# Patient Record
Sex: MALE | Race: WHITE | Employment: FULL TIME | URBAN - METROPOLITAN AREA
[De-identification: names, ages, dates, MRNs, and addresses within clinical notes are randomized per-mention and may not be internally consistent; named-entity substitution may affect disease eponyms.]

---

## 2017-01-30 RX ORDER — DESONIDE 0.5 MG/G
CREAM TOPICAL
Qty: 60 G | Refills: 0 | Status: SHIPPED | OUTPATIENT
Start: 2017-01-30 | End: 2017-10-10 | Stop reason: ALTCHOICE

## 2017-04-26 RX ORDER — SIMVASTATIN 40 MG/1
TABLET, FILM COATED ORAL
Qty: 90 TAB | Refills: 1 | Status: SHIPPED | OUTPATIENT
Start: 2017-04-26 | End: 2018-01-19 | Stop reason: SDUPTHER

## 2017-05-11 ENCOUNTER — OFFICE VISIT (OUTPATIENT)
Dept: FAMILY MEDICINE CLINIC | Age: 57
End: 2017-05-11

## 2017-05-11 VITALS
OXYGEN SATURATION: 95 % | SYSTOLIC BLOOD PRESSURE: 147 MMHG | HEIGHT: 69 IN | RESPIRATION RATE: 15 BRPM | TEMPERATURE: 98.3 F | HEART RATE: 91 BPM | WEIGHT: 245.2 LBS | DIASTOLIC BLOOD PRESSURE: 94 MMHG | BODY MASS INDEX: 36.32 KG/M2

## 2017-05-11 DIAGNOSIS — J06.9 UPPER RESPIRATORY TRACT INFECTION, UNSPECIFIED TYPE: Primary | ICD-10-CM

## 2017-05-11 DIAGNOSIS — R05.9 COUGH: ICD-10-CM

## 2017-05-11 RX ORDER — AZITHROMYCIN 250 MG/1
TABLET, FILM COATED ORAL
Qty: 6 TAB | Refills: 0 | Status: SHIPPED | OUTPATIENT
Start: 2017-05-11 | End: 2017-05-16

## 2017-05-11 RX ORDER — PROMETHAZINE HYDROCHLORIDE AND CODEINE PHOSPHATE 6.25; 1 MG/5ML; MG/5ML
5 SOLUTION ORAL
Qty: 118 BOTTLE | Refills: 0 | Status: SHIPPED | OUTPATIENT
Start: 2017-05-11 | End: 2018-08-27 | Stop reason: ALTCHOICE

## 2017-05-11 NOTE — PATIENT INSTRUCTIONS

## 2017-05-11 NOTE — LETTER
NOTIFICATION RETURN TO WORK  
 
5/11/2017 11:31 AM 
 
Mr. Lesia Schilder [address] [place] [phone] To Whom It May Concern: 
 
Lesia Schilder is currently under the care of LUIS E Lane. He will return to work on: 5/12/17 If there are questions or concerns please have the patient contact our office. Sincerely, Alfredo Galicia NP

## 2017-05-11 NOTE — MR AVS SNAPSHOT
Visit Information Date & Time Provider Department Dept. Phone Encounter #  
 5/11/2017 11:00 AM Lolis Galan NP [address] [place] [address] [phone] [id number] Upcoming Health Maintenance Date Due INFLUENZA AGE 9 TO ADULT 8/1/2017 DTaP/Tdap/Td series (2 - Td) 8/2/2021 COLONOSCOPY 12/11/2025 Allergies as of 5/11/2017  Review Complete On: 5/11/2017 By: Lolis Galan NP No Known Allergies Current Immunizations  Never Reviewed Name Date TDAP Vaccine 8/2/2011 Not reviewed this visit You Were Diagnosed With   
  
 Codes Comments Upper respiratory tract infection, unspecified type    -  Primary ICD-10-CM: J06.9 ICD-9-CM: 465.9 Cough     ICD-10-CM: R05 ICD-9-CM: 871. 2 Vitals BP Pulse Temp Resp Height(growth percentile) Weight(growth percentile) (!) 147/94 (BP 1 Location: Left arm, BP Patient Position: Sitting) 91 98.3 °F (36.8 °C) (Oral) 15 5' 9\" (1.753 m) 245 lb 3.2 oz (111.2 kg) SpO2 BMI Smoking Status 95% 36.21 kg/m2 Former Smoker Vitals History BMI and BSA Data Body Mass Index Body Surface Area  
 36.21 kg/m 2 2.33 m 2 Preferred Pharmacy Pharmacy Name Phone [id number] [phone] AT [address] [place] [address] [phone] Your Updated Medication List  
  
   
This list is accurate as of: 5/11/17 11:28 AM.  Always use your most recent med list.  
  
  
  
  
 azithromycin 250 mg tablet Commonly known as:  ZITHROMAX  
use as directed  
  
 beta carotene 25,000 unit capsule Take 25,000 Units by mouth daily. desonide 0.05 % cream  
Commonly known as:  TRIDESILON  
APPLY TO AFFECTED AREA 2 TIMES A DAY. FLUoxetine 20 mg capsule Commonly known as:  PROzac TAKE ONE CAPSULE BY MOUTH EVERY DAY  
  
 methylPREDNISolone 4 mg tablet Commonly known as:  Mickiel Meres Use as directed.   
  
 mometasone 0.1 % topical cream  
 Commonly known as:  ELOCON  
APPLY TO AFFECTED AREA DAILY. montelukast 10 mg tablet Commonly known as:  SINGULAIR  
TAKE 1 TALBET BY MOUTH DAILY promethazine-codeine 6.25-10 mg/5 mL syrup Commonly known as:  PHENERGAN with CODEINE Take 5 mL by mouth four (4) times daily as needed for Cough. Max Daily Amount: 20 mL. Indications: ALLERGIC RHINITIS, COLD SYMPTOMS, COUGH  
  
 simvastatin 40 mg tablet Commonly known as:  ZOCOR  
TAKE 1 TABLET BY MOUTH NIGHTLY. VITAMIN D3 1,000 unit Cap Generic drug:  cholecalciferol Take 2,000 Int'l Units/L by mouth.  
  
 vitamin E 400 unit capsule Commonly known as:  [address] Take  by mouth daily. ZyrTEC 10 mg Cap Generic drug:  Cetirizine Take  by mouth. Prescriptions Printed Refills  
 promethazine-codeine (PHENERGAN WITH CODEINE) 6.25-10 mg/5 mL syrup 0 Sig: Take 5 mL by mouth four (4) times daily as needed for Cough. Max Daily Amount: 20 mL. Indications: ALLERGIC RHINITIS, COLD SYMPTOMS, COUGH Class: Print Route: Oral  
  
Prescriptions Sent to Pharmacy Refills  
 azithromycin (ZITHROMAX) 250 mg tablet 0 Sig: use as directed Class: Normal  
 Pharmacy: [address] [place] [address], [facility] #: [phone] Patient Instructions Upper Respiratory Infection (Cold): Care Instructions Your Care Instructions An upper respiratory infection, or URI, is an infection of the nose, sinuses, or throat. URIs are spread by coughs, sneezes, and direct contact. The common cold is the most frequent kind of URI. The flu and sinus infections are other kinds of URIs. Almost all URIs are caused by viruses. Antibiotics won't cure them. But you can treat most infections with home care. This may include drinking lots of fluids and taking over-the-counter pain medicine. You will probably feel better in 4 to 10 days. The doctor has checked you carefully, but problems can develop later. If you notice any problems or new symptoms, get medical treatment right away. Follow-up care is a key part of your treatment and safety. Be sure to make and go to all appointments, and call your doctor if you are having problems. It's also a good idea to know your test results and keep a list of the medicines you take. How can you care for yourself at home? · To prevent dehydration, drink plenty of fluids, enough so that your urine is light yellow or clear like water. Choose water and other caffeine-free clear liquids until you feel better. If you have kidney, heart, or liver disease and have to limit fluids, talk with your doctor before you increase the amount of fluids you drink. · Take an over-the-counter pain medicine, such as acetaminophen (Tylenol), ibuprofen (Advil, Motrin), or naproxen (Aleve). Read and follow all instructions on the label. · Before you use cough and cold medicines, check the label. These medicines may not be safe for young children or for people with certain health problems. · Be careful when taking over-the-counter cold or flu medicines and Tylenol at the same time. Many of these medicines have acetaminophen, which is Tylenol. Read the labels to make sure that you are not taking more than the recommended dose. Too much acetaminophen (Tylenol) can be harmful. · Get plenty of rest. 
· Do not smoke or allow others to smoke around you. If you need help quitting, talk to your doctor about stop-smoking programs and medicines. These can increase your chances of quitting for good. When should you call for help? Call 911 anytime you think you may need emergency care. For example, call if: 
· You have severe trouble breathing. Call your doctor now or seek immediate medical care if: 
· You seem to be getting much sicker. · You have new or worse trouble breathing. · You have a new or higher fever. · You have a new rash. Watch closely for changes in your health, and be sure to contact your doctor if: 
· You have a new symptom, such as a sore throat, an earache, or sinus pain. · You cough more deeply or more often, especially if you notice more mucus or a change in the color of your mucus. · You do not get better as expected. Where can you learn more? Go to http://ranjith-sho.info/. Enter X416 in the search box to learn more about \"Upper Respiratory Infection (Cold): Care Instructions. \" Current as of: June 30, 2016 Content Version: 11.2 © 6414-6298 [employer]. Care instructions adapted under license by [employer] (which disclaims liability or warranty for this information). If you have questions about a medical condition or this instruction, always ask your healthcare professional. [place] [address] any warranty or liability for your use of this information. Introducing [facility]! Dear William Land: 
Thank you for requesting a ShowMe account. Our records indicate that you already have an active ShowMe account. You can access your account anytime at [email or web address] Did you know that you can access your hospital and ER discharge instructions at any time in ShowMe? You can also review all of your test results from your hospital stay or ER visit. Additional Information If you have questions, please visit the Frequently Asked Questions section of the ShowMe website at [email or web address] Remember, ShowMe is NOT to be used for urgent needs. For medical emergencies, dial 911. Now available from your iPhone and Android! Please provide this summary of care documentation to your next provider. Your primary care clinician is listed as [place] [address], [facility]/s . If you have any questions after today's visit, please call [phone].

## 2017-05-11 NOTE — PROGRESS NOTES
Chief Complaint   Patient presents with    Cold Symptoms       1. Have you been to the ER, urgent care clinic since your last visit? Hospitalized since your last visit? No    2. Have you seen or consulted any other health care providers outside of the [address] [place] [address] since your last visit? Include any pap smears or colon screening. No    Body mass index is 36.21 kg/(m^2).

## 2017-05-11 NOTE — PROGRESS NOTES
Moira Moreno is a 55 y.o. male who is being seen  today for an acute care visit  today (5/11/2017). Assessments and plans as follows:     Assessment & Plan:  Fransisco Hendrix was seen today for cold symptoms. Diagnoses and all orders for this visit:    Upper respiratory tract infection, unspecified type  -     promethazine-codeine (PHENERGAN WITH CODEINE) 6.25-10 mg/5 mL syrup; Take 5 mL by mouth four (4) times daily as needed for Cough. Max Daily Amount: 20 mL. Indications: ALLERGIC RHINITIS, COLD SYMPTOMS, COUGH  -     azithromycin (ZITHROMAX) 250 mg tablet; use as directed    Cough  -     promethazine-codeine (PHENERGAN WITH CODEINE) 6.25-10 mg/5 mL syrup; Take 5 mL by mouth four (4) times daily as needed for Cough. Max Daily Amount: 20 mL. Indications: ALLERGIC RHINITIS, COLD SYMPTOMS, COUGH  -     azithromycin (ZITHROMAX) 250 mg tablet; use as directed         ----------------------------------------------------------------------    Subjective / HPI:  Moira Moreno presents to office today for an acute care visit for cold like symptoms that started 6 days ago, that began with a sore throat. Now he has  Productive cough with greenish phlegm. He awakens in the middle of the night with coughing spells. He endorses fevers no chills, low grade 100.4. No eyes or ear involvement. He has a history of sinusitis. He has been using OTC mucinex for the past 2-3 days. Symptoms improve, only to return. Prior to Admission medications    Medication Sig Start Date End Date Taking?  Authorizing Provider   simvastatin (ZOCOR) 40 mg tablet TAKE 1 TABLET BY MOUTH NIGHTLY. 4/26/17  Yes Mian Cash NP   desonide (TRIDESILON) 0.05 % cream APPLY TO AFFECTED AREA 2 TIMES A DAY. 1/30/17  Yes Radha Multani MD   mometasone (ELOCON) 0.1 % topical cream APPLY TO AFFECTED AREA DAILY. 11/29/16  Yes Kaylene Schumacher MD   FLUoxetine (PROZAC) 20 mg capsule TAKE ONE CAPSULE BY MOUTH EVERY DAY 10/3/16  Yes John Woods NP   Cetirizine (ZYRTEC) 10 mg cap Take  by mouth. Yes Historical Provider   beta carotene 25,000 unit capsule Take 25,000 Units by mouth daily. Yes Historical Provider   vitamin E (AQUA GEMS) 400 unit capsule Take  by mouth daily. Yes Historical Provider   Cholecalciferol, Vitamin D3, (VITAMIN D3) 1,000 unit cap Take 2,000 Int'l Units/L by mouth. Yes Historical Provider   methylPREDNISolone (MEDROL DOSEPACK) 4 mg tablet Use as directed. 12/8/16   Jem Shape BERENICE Saini   montelukast (SINGULAIR) 10 mg tablet TAKE 1 TALBET BY MOUTH DAILY 11/7/16   Kaylene Schumacher MD          No Known Allergies        ROS    See HPI    Past Medical History:   Diagnosis Date    Acne     Allergic rhinitis, cause unspecified 5/13/2014    Contact dermatitis- ketoconazole 1/20/2015    Contact dermatitis- ketoconazole 1/20/2015    Depression     Eczema 1/20/2015    FARZANA (obstructive sleep apnea) 2006    cpap 12cm    Other and unspecified hyperlipidemia     Sleep apnea 8/2/2011    Vitamin D deficiency 5/13/2014       Visit Vitals    BP (!) 147/94 (BP 1 Location: Left arm, BP Patient Position: Sitting)    Pulse 91    Temp 98.3 °F (36.8 °C) (Oral)    Resp 15    Ht 5' 9\" (1.753 m)    Wt 245 lb 3.2 oz (111.2 kg)    SpO2 95%    BMI 36.21 kg/m2       Objective:   Physical Exam   Cardiovascular: Regular rhythm and normal heart sounds. No murmur heard. Pulmonary/Chest: Effort normal and breath sounds normal. He has no wheezes. He has no rales. Abdominal: Soft. Bowel sounds are normal. There is no hepatosplenomegaly. There is no tenderness. There is no rebound. Disclaimer:  Advised him to call back or return to office if symptoms worsen/change/persist.  Discussed expected course/resolution/complications of diagnosis in detail with patient.     Medication risks/benefits/costs/interactions/alternatives discussed with patient. He was given an after visit summary which includes diagnoses, current medications, & vitals. He expressed understanding with the diagnosis and plan.         Electronic Signature  Adelaida Edgar NP , [facility]  [phone]

## 2017-06-06 RX ORDER — MOMETASONE FUROATE 1 MG/G
CREAM TOPICAL
Qty: 60 G | Refills: 3 | Status: SHIPPED | OUTPATIENT
Start: 2017-06-06 | End: 2019-02-03 | Stop reason: SDUPTHER

## 2017-09-21 RX ORDER — FLUOXETINE HYDROCHLORIDE 20 MG/1
CAPSULE ORAL
Qty: 90 CAP | Refills: 3 | Status: SHIPPED | OUTPATIENT
Start: 2017-09-21 | End: 2018-09-30 | Stop reason: SDUPTHER

## 2017-10-09 ENCOUNTER — OFFICE VISIT (OUTPATIENT)
Dept: FAMILY MEDICINE CLINIC | Age: 57
End: 2017-10-09

## 2017-10-09 VITALS
TEMPERATURE: 97.8 F | RESPIRATION RATE: 18 BRPM | OXYGEN SATURATION: 96 % | HEIGHT: 69 IN | BODY MASS INDEX: 36.97 KG/M2 | HEART RATE: 69 BPM | WEIGHT: 249.6 LBS | SYSTOLIC BLOOD PRESSURE: 133 MMHG | DIASTOLIC BLOOD PRESSURE: 84 MMHG

## 2017-10-09 DIAGNOSIS — E55.9 VITAMIN D DEFICIENCY: ICD-10-CM

## 2017-10-09 DIAGNOSIS — L29.9 ITCHY SKIN: ICD-10-CM

## 2017-10-09 DIAGNOSIS — Z12.5 PROSTATE CANCER SCREENING: ICD-10-CM

## 2017-10-09 DIAGNOSIS — E78.5 HYPERLIPIDEMIA LDL GOAL <130: Primary | ICD-10-CM

## 2017-10-09 RX ORDER — LORATADINE 10 MG/1
10 TABLET ORAL
COMMUNITY

## 2017-10-09 RX ORDER — TRIAMCINOLONE ACETONIDE 1 MG/G
CREAM TOPICAL 2 TIMES DAILY
Qty: 60 G | Refills: 1 | Status: SHIPPED | OUTPATIENT
Start: 2017-10-09 | End: 2019-02-03 | Stop reason: SDUPTHER

## 2017-10-09 NOTE — PROGRESS NOTES
HISTORY OF PRESENT ILLNESS  HPI  Arturo Iverson is a 55 y.o. male with history of hyperlipidemia and vitamin D deficiency who presents to office today for a rash. Pt complains of a full-body itchy rash, especially on his neck, chest, and groin, for 2+ years. He has seen a dermatologist and was told the rash was candidiasis; a biopsy of the rash one year ago was positive for candida. He was given an antifungal shampoo, which he used without relief; he has also used various lotions without relief. He tried Aquaflora with relief and has also used clobetasol with some relief. Pt states that he showers once a day with lukewarm water. Past Medical History:   Diagnosis Date    Acne     Allergic rhinitis, cause unspecified 5/13/2014    Contact dermatitis- ketoconazole 1/20/2015    Contact dermatitis- ketoconazole 1/20/2015    Depression     Eczema 1/20/2015    Hyperlipidemia LDL goal <130 10/10/2017    Itchy skin- entire body . x face-allergist and dermatologist treatments have not helped-started~2013 10/10/2017    FARZANA (obstructive sleep apnea) 2006    cpap 12cm    Other and unspecified hyperlipidemia     Sleep apnea 8/2/2011    Vitamin D deficiency 5/13/2014     History reviewed. No pertinent surgical history. Current Outpatient Prescriptions on File Prior to Visit   Medication Sig Dispense Refill    FLUoxetine (PROZAC) 20 mg capsule TAKE ONE CAPSULE BY MOUTH EVERY DAY 90 Cap 3    mometasone (ELOCON) 0.1 % topical cream APPLY TO AFFECTED AREA DAILY. 60 g 3    promethazine-codeine (PHENERGAN WITH CODEINE) 6.25-10 mg/5 mL syrup Take 5 mL by mouth four (4) times daily as needed for Cough. Max Daily Amount: 20 mL. Indications: ALLERGIC RHINITIS, COLD SYMPTOMS, COUGH 118 Bottle 0    simvastatin (ZOCOR) 40 mg tablet TAKE 1 TABLET BY MOUTH NIGHTLY. 90 Tab 1    montelukast (SINGULAIR) 10 mg tablet TAKE 1 TALBET BY MOUTH DAILY 30 Tab 11    Cetirizine (ZYRTEC) 10 mg cap Take  by mouth.       beta carotene 25,000 unit capsule Take 25,000 Units by mouth daily.  vitamin E (AQUA GEMS) 400 unit capsule Take  by mouth daily.  Cholecalciferol, Vitamin D3, (VITAMIN D3) 1,000 unit cap Take 2,000 Int'l Units/L by mouth. No current facility-administered medications on file prior to visit. No Known Allergies  Family History   Problem Relation Age of Onset    Diabetes Mother     Elevated Lipids Mother     Hypertension Mother     Diabetes Father     Heart Disease Father     Stroke Father     Hypertension Father     Elevated Lipids Father     Heart Disease Brother      heart valve replacement     Social History     Social History    Marital status:      Spouse name: N/A    Number of children: N/A    Years of education: N/A     Social History Main Topics    Smoking status: Former Smoker     Quit date: 7/15/1987    Smokeless tobacco: Never Used    Alcohol use 2.5 oz/week     5 Cans of beer per week    Drug use: No    Sexual activity: Yes     Partners: Female     Other Topics Concern     Service Yes    Blood Transfusions No    Caffeine Concern No    Occupational Exposure No    Hobby Hazards No    Sleep Concern No    Stress Concern No    Weight Concern Yes    Special Diet No    Back Care Yes     occasional visits to chiropractor    Exercise No    Bike Helmet Not Asked     na    Seat Belt Yes    Self-Exams No     Social History Narrative             Review of Systems   Constitutional: Negative for chills, diaphoresis, fever, malaise/fatigue and weight loss. Eyes: Negative for blurred vision, double vision, pain and redness. Respiratory: Negative for cough, shortness of breath and wheezing. Cardiovascular: Negative for chest pain, palpitations, orthopnea, claudication, leg swelling and PND. Skin: Positive for itching and rash (erythema).    Neurological: Negative for dizziness, tingling, tremors, sensory change, speech change, focal weakness, seizures, loss of consciousness, weakness and headaches. Results for orders placed or performed in visit on 77/37/13   METABOLIC PANEL, COMPREHENSIVE   Result Value Ref Range    Glucose 102 (H) 65 - 99 mg/dL    BUN 12 6 - 24 mg/dL    Creatinine 1.08 0.76 - 1.27 mg/dL    GFR est non-AA 77 >59 mL/min/1.73    GFR est AA 89 >59 mL/min/1.73    BUN/Creatinine ratio 11 9 - 20    Sodium 141 134 - 144 mmol/L    Potassium 4.5 3.5 - 5.2 mmol/L    Chloride 102 97 - 108 mmol/L    CO2 24 18 - 29 mmol/L    Calcium 8.9 8.7 - 10.2 mg/dL    Protein, total 6.4 6.0 - 8.5 g/dL    Albumin 4.2 3.5 - 5.5 g/dL    GLOBULIN, TOTAL 2.2 1.5 - 4.5 g/dL    A-G Ratio 1.9 1.1 - 2.5    Bilirubin, total 0.3 0.0 - 1.2 mg/dL    Alk.  phosphatase 61 39 - 117 IU/L    AST (SGOT) 24 0 - 40 IU/L    ALT (SGPT) 30 0 - 44 IU/L   LIPID PANEL   Result Value Ref Range    Cholesterol, total 155 100 - 199 mg/dL    Triglyceride 130 0 - 149 mg/dL    HDL Cholesterol 40 >39 mg/dL    VLDL, calculated 26 5 - 40 mg/dL    LDL, calculated 89 0 - 99 mg/dL   CBC W/O DIFF   Result Value Ref Range    WBC 6.7 3.4 - 10.8 x10E3/uL    RBC 4.87 4.14 - 5.80 x10E6/uL    HGB 14.7 12.6 - 17.7 g/dL    HCT 44.1 37.5 - 51.0 %    MCV 91 79 - 97 fL    MCH 30.2 26.6 - 33.0 pg    MCHC 33.3 31.5 - 35.7 g/dL    RDW 13.8 12.3 - 15.4 %    PLATELET 306 698 - 218 x10E3/uL   URINALYSIS W/MICROSCOPIC   Result Value Ref Range    Specific Gravity 1.021 1.005 - 1.030    pH (UA) 5.5 5.0 - 7.5    Color Yellow Yellow    Appearance Clear Clear    Leukocyte Esterase Negative Negative    Protein Negative Negative/Trace    Glucose Negative Negative    Ketone Negative Negative    Blood Negative Negative    Bilirubin Negative Negative    Urobilinogen 0.2 0.2 - 1.0 mg/dL    Nitrites Negative Negative    Microscopic Examination Comment     Microscopic exam See additional order    PROSTATE SPECIFIC AG (PSA)   Result Value Ref Range    Prostate Specific Ag 0.4 0.0 - 4.0 ng/mL   VITAMIN D, 25 HYDROXY   Result Value Ref Range VITAMIN D, 25-HYDROXY 31.3 30.0 - 100.0 ng/mL   MICROSCOPIC EXAMINATION   Result Value Ref Range    WBC 0-5 0 - 5 /hpf    RBC 0-2 0 - 2 /hpf    Epithelial cells None seen 0 - 10 /hpf    Casts None seen None seen /lpf    Mucus Present Not Estab. Bacteria Few None seen/Few   CVD REPORT   Result Value Ref Range    INTERPRETATION Note              Physical Exam  Visit Vitals    /84 (BP 1 Location: Left arm, BP Patient Position: Sitting)    Pulse 69    Temp 97.8 °F (36.6 °C) (Oral)    Resp 18    Ht 5' 9\" (1.753 m)    Wt 249 lb 9.6 oz (113.2 kg)    SpO2 96%    BMI 36.86 kg/m2     Lungs: clear to auscultation bilaterally  Heart: regular rate and rhythm, S1, S2 normal, no murmur, click, rub or gallop  Skin: in general he has dry skin, no rash that you can appreciate in most of the areas he points out, just some slight erythema where he was maybe scratching the area, no vesicles or pustules. In the genital area, there is some very slight erythema in the crease of the leg where it meets the groin but no other rash appreciated          ASSESSMENT and PLAN    ICD-10-CM ICD-9-CM    1. Hyperlipidemia LDL goal <130 E78.5 272.4 LIPID PANEL      METABOLIC PANEL, COMPREHENSIVE   2. Itchy skin- entire body . x face-allergist and dermatologist treatments have not helped-started~2013 L29.9 698.9 GEL DRESSING (AQUAFLO EX)      loratadine (CLARITIN) 10 mg tablet      triamcinolone acetonide (KENALOG) 0.1 % topical cream   3. Vitamin D deficiency E55.9 268.9 VITAMIN D, 25 HYDROXY   4. Prostate cancer screening Z12.5 V76.44 PSA, DIAGNOSTIC (PROSTATE SPECIFIC AG)     Diagnoses and all orders for this visit:    1. Hyperlipidemia LDL goal <130  -     LIPID PANEL  -     METABOLIC PANEL, COMPREHENSIVE    2. Itchy skin- entire body . x face-allergist and dermatologist treatments have not helped-started~2013  -     triamcinolone acetonide (KENALOG) 0.1 % topical cream; Apply  to affected area two (2) times a day.  use thin layer    3. Vitamin D deficiency  -     VITAMIN D, 25 HYDROXY    4. Prostate cancer screening  -     PROSTATE SPECIFIC AG      Follow-up Disposition:  Return in about 6 months (around 4/9/2018), or if symptoms worsen or fail to improve, for F/U cholesterol, obesity. reviewed medications and side effects in detail  Please call my office if there are any questions- [phone]. Discussed expected course/resolution/complications of diagnosis in detail with patient. Medication risks/benefits/costs/interactions/alternatives discussed with patient. Pt was given an after visit summary which includes diagnoses, current medications & vitals. Pt expressed understanding with the diagnosis and plan. Total 25 minutes,60 % counseling re: Reviewed symptoms, or lack thereof, of hypertension and elevated cholesterol. I suggested that he try a heated Vaseline application to his skin in general after showering and continue with a low temperature shower. He should use a less drying soap, either Dove or Lever 2000. For the marked itching that he's having, he can use Kenalog cream sparingly. I told him I hadn't seen anyone before who had a diffuse candida skin infection, so he needs to follow that up with the dermatologist if he doesn't get better with the above recommendations. He's past due for follow-up on his cholesterol so we put the labs in the computer, and he can come back and do that at his convenience. He takes simvastatin regularly for that. He is on Vit d regularly for the VIt D def. Also, discussed symptoms of concern that were noted today in the note above, treatment options( including doing nothing), when to follow up before recommended time frame. Also, answered all questions. This document was written by Jilda Gowers, as dictated by Vasiliy Sr MD.  I have reviewed and agree with the above note and have made corrections where appropriate José Manuel Andre M.D.

## 2017-10-09 NOTE — PATIENT INSTRUCTIONS
Candidiasis: Care Instructions  Your Care Instructions  Candidiasis (say \"xno-hhg-ZB-uh-roby\") is a yeast infection. Yeast normally lives in your body. But it can cause problems if your body's defenses don't work as they should. Some medicines can increase your chance of getting a yeast infection. These include antibiotics, steroids, and cancer drugs. And some diseases like AIDS and diabetes can make you more likely to get yeast infections. There are different types of yeast infections. Papi Bushy is a yeast infection in the mouth. It usually occurs in people with weak immune systems. It causes white patches inside the mouth and throat. Yeast infections of the skin usually occur in skin folds where the skin stays moist. They cause red, oozing patches on your skin. Babies can get these infections under the diaper. People who often wear gloves can get them on their hands. Many women get vaginal yeast infections. They are most common when women take antibiotics. These infections can cause the vagina to itch and burn. They also cause white discharge that looks like cottage cheese. In rare cases, yeast infects the blood. This can cause serious disease. This kind of infection is treated with medicine given through a needle into a vein (IV). After you start treatment, a yeast infection usually goes away quickly. But if your immune system is weak, the infection may come back. Tell your doctor if you get yeast infections often. Follow-up care is a key part of your treatment and safety. Be sure to make and go to all appointments, and call your doctor if you are having problems. It's also a good idea to know your test results and keep a list of the medicines you take. How can you care for yourself at home? · Take your medicines exactly as prescribed. Call your doctor if you think you are having a problem with your medicine. · Use antibiotics only as directed by your doctor. · Eat yogurt with live cultures.  It has bacteria called lactobacillus. It may help prevent some types of yeast infections. · Keep your skin clean and dry. Put powder on moist places. · If you are using a cream or suppository to treat a vaginal yeast infection, don't use condoms or a diaphragm. Use a different type of birth control. · Eat a healthy diet and get regular exercise. This will help keep your immune system strong. When should you call for help? Call your doctor now or seek immediate medical care if:  · You have a fever. · You are pregnant and have signs of a vaginal or urinary tract infection such as:  ¨ Severe itching in your vagina. ¨ Pain during sex or when you urinate. ¨ Unusual discharge from your vagina. ¨ A frequent urge to urinate. ¨ Urine that is cloudy or smells bad. Watch closely for changes in your health, and be sure to contact your doctor if:  · You do not get better as expected. Where can you learn more? Go to http://ranjith-sho.info/. Enter T045 in the search box to learn more about \"Candidiasis: Care Instructions. \"  Current as of: October 13, 2016  Content Version: 11.3  © 1920-6252 [employer]. Care instructions adapted under license by [employer] (which disclaims liability or warranty for this information). If you have questions about a medical condition or this instruction, always ask your healthcare professional. [address] any warranty or liability for your use of this information.

## 2017-10-09 NOTE — PROGRESS NOTES
\"Reviewed record in preparation for visit and have obtained the necessary documentation\"  Chief Complaint   Patient presents with    Rash     itching to neck,chest,and groin areas chronically, advised per [facility] skin            1. Have you been to the ER, urgent care clinic since your last visit? Hospitalized since your last visit? No    2. Have you seen or consulted any other health care providers outside of the [address] [place] [address] since your last visit? Include any pap smears or colon screening.  No

## 2017-10-10 PROBLEM — L29.9 ITCHY SKIN: Chronic | Status: ACTIVE | Noted: 2017-10-10

## 2017-10-10 PROBLEM — E78.5 HYPERLIPIDEMIA LDL GOAL <130: Status: ACTIVE | Noted: 2017-10-10

## 2017-10-10 PROBLEM — L29.9 ITCHY SKIN: Status: ACTIVE | Noted: 2017-10-10

## 2018-01-16 LAB
25(OH)D3+25(OH)D2 SERPL-MCNC: 36.4 NG/ML (ref 30–100)
ALBUMIN SERPL-MCNC: 4.2 G/DL (ref 3.5–5.5)
ALBUMIN/GLOB SERPL: 1.6 {RATIO} (ref 1.2–2.2)
ALP SERPL-CCNC: 57 IU/L (ref 39–117)
ALT SERPL-CCNC: 29 IU/L (ref 0–44)
AST SERPL-CCNC: 24 IU/L (ref 0–40)
BILIRUB SERPL-MCNC: 0.4 MG/DL (ref 0–1.2)
BUN SERPL-MCNC: 14 MG/DL (ref 6–24)
BUN/CREAT SERPL: 14 (ref 9–20)
CALCIUM SERPL-MCNC: 9 MG/DL (ref 8.7–10.2)
CHLORIDE SERPL-SCNC: 101 MMOL/L (ref 96–106)
CHOLEST SERPL-MCNC: 188 MG/DL (ref 100–199)
CO2 SERPL-SCNC: 25 MMOL/L (ref 18–29)
CREAT SERPL-MCNC: 0.98 MG/DL (ref 0.76–1.27)
GLOBULIN SER CALC-MCNC: 2.7 G/DL (ref 1.5–4.5)
GLUCOSE SERPL-MCNC: 93 MG/DL (ref 65–99)
HDLC SERPL-MCNC: 45 MG/DL
INTERPRETATION, 910389: NORMAL
LDLC SERPL CALC-MCNC: 115 MG/DL (ref 0–99)
POTASSIUM SERPL-SCNC: 4.3 MMOL/L (ref 3.5–5.2)
PROT SERPL-MCNC: 6.9 G/DL (ref 6–8.5)
PSA SERPL-MCNC: 0.7 NG/ML (ref 0–4)
SODIUM SERPL-SCNC: 141 MMOL/L (ref 134–144)
TRIGL SERPL-MCNC: 142 MG/DL (ref 0–149)
VLDLC SERPL CALC-MCNC: 28 MG/DL (ref 5–40)

## 2018-01-22 RX ORDER — SIMVASTATIN 40 MG/1
TABLET, FILM COATED ORAL
Qty: 90 TAB | Refills: 1 | Status: SHIPPED | OUTPATIENT
Start: 2018-01-22 | End: 2018-08-17 | Stop reason: SDUPTHER

## 2018-04-12 NOTE — PROGRESS NOTES
GREAT NEWS!! All of your recent lab tests are normal or at goal. I would continue everything the same and schedule your next fasting office visit in 6 months. In addition, a physical is recommended every 2 years until the age of 61, but a prostate check should be done on a yearly basis after 40.

## 2018-08-21 RX ORDER — SIMVASTATIN 40 MG/1
TABLET, FILM COATED ORAL
Qty: 90 TAB | Refills: 0 | Status: SHIPPED | OUTPATIENT
Start: 2018-08-21 | End: 2018-12-09 | Stop reason: SDUPTHER

## 2018-08-27 ENCOUNTER — OFFICE VISIT (OUTPATIENT)
Dept: FAMILY MEDICINE CLINIC | Age: 58
End: 2018-08-27

## 2018-08-27 VITALS
HEART RATE: 73 BPM | HEIGHT: 69 IN | BODY MASS INDEX: 36.61 KG/M2 | OXYGEN SATURATION: 96 % | WEIGHT: 247.2 LBS | SYSTOLIC BLOOD PRESSURE: 139 MMHG | TEMPERATURE: 98.2 F | DIASTOLIC BLOOD PRESSURE: 88 MMHG | RESPIRATION RATE: 15 BRPM

## 2018-08-27 DIAGNOSIS — F33.9 RECURRENT MAJOR DEPRESSIVE DISORDER, REMISSION STATUS UNSPECIFIED (HCC): ICD-10-CM

## 2018-08-27 DIAGNOSIS — E78.5 HYPERLIPIDEMIA LDL GOAL <130: Primary | ICD-10-CM

## 2018-08-27 PROBLEM — E66.01 SEVERE OBESITY (BMI 35.0-39.9): Status: ACTIVE | Noted: 2018-08-27

## 2018-08-27 NOTE — MR AVS SNAPSHOT
Carolee Cordova 
 
 
 [address] [facility] [address] [place], [place] [id number] Patient: Perry Deluca MRN: [id number] :1960 Visit Information Date & Time Provider Department Dept. Phone Encounter #  
 2018  8:15 AM Asad Burnett, [address] [place] [phone] [id number] Upcoming Health Maintenance Date Due Influenza Age 5 to Adult 2018 DTaP/Tdap/Td series (2 - Td) 2021 COLONOSCOPY 2025 Allergies as of 2018  Review Complete On: 2018 By: Asad Burnett NP No Known Allergies Current Immunizations  Never Reviewed Name Date TDAP Vaccine 2011 Not reviewed this visit You Were Diagnosed With   
  
 Codes Comments Hyperlipidemia LDL goal <130    -  Primary ICD-10-CM: E78.5 ICD-9-CM: 272.4 Vitals BP Pulse Temp Resp Height(growth percentile) Weight(growth percentile) 139/88 (BP 1 Location: Right arm, BP Patient Position: Sitting) 73 98.2 °F (36.8 °C) (Oral) 15 5' 9\" (1.753 m) 247 lb 3.2 oz (112.1 kg) SpO2 BMI Smoking Status 96% 36.51 kg/m2 Former Smoker Vitals History BMI and BSA Data Body Mass Index Body Surface Area  
 36.51 kg/m 2 2.34 m 2 Preferred Pharmacy Pharmacy Name Phone [id number]- [place] - [address] [facility] AT [address] [place] [address] [phone] Your Updated Medication List  
  
   
This list is accurate as of 18  8:48 AM.  Always use your most recent med list.  
  
  
  
  
 beta carotene 25,000 unit capsule Take 25,000 Units by mouth daily. CLARITIN 10 mg tablet Generic drug:  loratadine Take 10 mg by mouth. FLUoxetine 20 mg capsule Commonly known as:  PROzac TAKE ONE CAPSULE BY MOUTH EVERY DAY  
  
 mometasone 0.1 % topical cream  
Commonly known as:  ELOCON  
APPLY TO AFFECTED AREA DAILY. simvastatin 40 mg tablet Commonly known as:  ZOCOR  
TAKE 1 TABLET BY MOUTH NIGHTLY.  
  
 triamcinolone acetonide 0.1 % topical cream  
Commonly known as:  KENALOG Apply  to affected area two (2) times a day. use thin layer  
  
 vitamin a-vitamin c-vit e-min tablet Commonly known as:  [place] Nicole Take 1 Tab by mouth daily. VITAMIN D3 1,000 unit Cap Generic drug:  cholecalciferol Take 2,000 Int'l Units/L by mouth.  
  
 vitamin E 400 unit capsule Commonly known as:  [address] Take  by mouth daily. We Performed the Following COLLECTION VENOUS BLOOD,VENIPUNCTURE R6204256 CPT(R)] LIPID PANEL [52612 CPT(R)] METABOLIC PANEL, COMPREHENSIVE [88297 CPT(R)] [place] HANDLG&/OR CONVEY OF SPEC FOR TR OFFICE TO LAB [88742 CPT(R)] Introducing [facility]! Dear Adelita Schaefer: 
Thank you for requesting a PSafe account. Our records indicate that you already have an active PSafe account. You can access your account anytime at [email or web address] Did you know that you can access your hospital and ER discharge instructions at any time in PSafe? You can also review all of your test results from your hospital stay or ER visit. Additional Information If you have questions, please visit the Frequently Asked Questions section of the PSafe website at [email or web address] Remember, PSafe is NOT to be used for urgent needs. For medical emergencies, dial 911. Now available from your iPhone and Android! Please provide this summary of care documentation to your next provider. Your primary care clinician is listed as [place] [address], [facility]/s . If you have any questions after today's visit, please call [phone].

## 2018-08-27 NOTE — PROGRESS NOTES
HISTORY OF PRESENT ILLNESS  Kalpana Rivers is a 65 y.o. male. HPI  Follow up chronic health problems. Hyperlipidemia. Taking prescribed statin. Following healthy diet, no regular exercise. Stays active. Depression. Taking prescribed prozac with good sx control. Past Medical History:   Diagnosis Date    Acne     Allergic rhinitis, cause unspecified 5/13/2014    Contact dermatitis- ketoconazole 1/20/2015    Contact dermatitis- ketoconazole 1/20/2015    Depression     Eczema 1/20/2015    Hyperlipidemia LDL goal <130 10/10/2017    Itchy skin- entire body . x face-allergist and dermatologist treatments have not helped-started~2013 10/10/2017    FARZANA (obstructive sleep apnea) 2006    cpap 12cm    Other and unspecified hyperlipidemia     Sleep apnea 8/2/2011    Vitamin D deficiency 5/13/2014     Patient Active Problem List   Diagnosis Code    Acne L70.9    Sleep apnea G47.30    Depression- ++ Family Hx F32.9    Vitamin D deficiency E55.9    Allergic rhinitis J30.9    Eczema L30.9    Contact dermatitis- ketoconazole L25.9    Itching- skin, scalp L29.9    Itchy skin- entire body . x face-allergist and dermatologist treatments have not helped-started~2013 L29.9    Hyperlipidemia LDL goal <130 E78.5    Severe obesity (BMI 35.0-39.9) ([facility]) E66.01     Current Outpatient Prescriptions   Medication Sig    simvastatin (ZOCOR) 40 mg tablet TAKE 1 TABLET BY MOUTH NIGHTLY.  loratadine (CLARITIN) 10 mg tablet Take 10 mg by mouth.  triamcinolone acetonide (KENALOG) 0.1 % topical cream Apply  to affected area two (2) times a day. use thin layer (Patient taking differently: Apply  to affected area as needed. use thin layer)    FLUoxetine (PROZAC) 20 mg capsule TAKE ONE CAPSULE BY MOUTH EVERY DAY    mometasone (ELOCON) 0.1 % topical cream APPLY TO AFFECTED AREA DAILY.  beta carotene 25,000 unit capsule Take 25,000 Units by mouth daily.     vitamin E (AQUA GEMS) 400 unit capsule Take  by mouth daily.    Cholecalciferol, Vitamin D3, (VITAMIN D3) 1,000 unit cap Take 2,000 Int'l Units/L by mouth.  vitamin a-vitamin c-vit e-min (OCUVITE) tablet Take 1 Tab by mouth daily. No current facility-administered medications for this visit. Social History   Substance Use Topics    Smoking status: Former Smoker     Quit date: 7/15/1987    Smokeless tobacco: Never Used    Alcohol use 2.5 oz/week     5 Cans of beer per week     Visit Vitals    /88 (BP 1 Location: Right arm, BP Patient Position: Sitting)    Pulse 73    Temp 98.2 °F (36.8 °C) (Oral)    Resp 15    Ht 5' 9\" (1.753 m)    Wt 247 lb 3.2 oz (112.1 kg)    SpO2 96%    BMI 36.51 kg/m2     Review of Systems   Constitutional: Negative. Respiratory: Negative for cough and shortness of breath. Cardiovascular: Negative for chest pain, palpitations and leg swelling. Psychiatric/Behavioral: Positive for depression. Negative for hallucinations, substance abuse and suicidal ideas. The patient is not nervous/anxious and does not have insomnia. All other systems reviewed and are negative. Physical Exam   Constitutional: No distress. Overweight. Neck: Neck supple. Cardiovascular: Normal rate, regular rhythm and normal heart sounds. Pulmonary/Chest: Effort normal and breath sounds normal.   Abdominal: Soft. He exhibits no distension. There is no tenderness. There is no guarding. Musculoskeletal: Normal range of motion. He exhibits no edema. Lymphadenopathy:     He has no cervical adenopathy. Neurological: He is alert. Coordination normal.   Skin: Skin is warm and dry. Psychiatric: He has a normal mood and affect. His behavior is normal. Thought content normal.       ASSESSMENT and PLAN  Diagnoses and all orders for this visit:    1.  Hyperlipidemia LDL goal <130  -     LIPID PANEL  -     METABOLIC PANEL, COMPREHENSIVE  -     [place] HANDLG&/OR CONVEY OF SPEC FOR TR OFFICE TO LAB  -     COLLECTION VENOUS BLOOD,VENIPUNCTURE  Reviewed healthy diet and exercise recommendations. Fasting labs sent. 2. BMI 36.0-36.9,adult  Reviewed above normal BMI with patient. The following interventions were recommended: reduced calorie diet, nutritional guidance and counseling given, exercise encouragement and weight monitoring. 3. Recurrent major depressive disorder, remission status unspecified (HCC)    Stable on prozac. Continue current treatment. Follow up 6 months or sooner prn.

## 2018-08-27 NOTE — PROGRESS NOTES
1. Have you been to the ER, urgent care clinic since your last visit? Hospitalized since your last visit? No    2. Have you seen or consulted any other health care providers outside of the [address] [place] [address] since your last visit? Include any pap smears or colon screening.  No     Chief Complaint   Patient presents with    Cholesterol Problem     follow up    Depression     follow up on prozac     Fasting

## 2018-08-28 LAB
ALBUMIN SERPL-MCNC: 4.6 G/DL (ref 3.5–5.5)
ALBUMIN/GLOB SERPL: 1.8 {RATIO} (ref 1.2–2.2)
ALP SERPL-CCNC: 63 IU/L (ref 39–117)
ALT SERPL-CCNC: 25 IU/L (ref 0–44)
AST SERPL-CCNC: 19 IU/L (ref 0–40)
BILIRUB SERPL-MCNC: 0.4 MG/DL (ref 0–1.2)
BUN SERPL-MCNC: 15 MG/DL (ref 6–24)
BUN/CREAT SERPL: 14 (ref 9–20)
CALCIUM SERPL-MCNC: 9.2 MG/DL (ref 8.7–10.2)
CHLORIDE SERPL-SCNC: 102 MMOL/L (ref 96–106)
CHOLEST SERPL-MCNC: 155 MG/DL (ref 100–199)
CO2 SERPL-SCNC: 22 MMOL/L (ref 20–29)
CREAT SERPL-MCNC: 1.09 MG/DL (ref 0.76–1.27)
GLOBULIN SER CALC-MCNC: 2.6 G/DL (ref 1.5–4.5)
GLUCOSE SERPL-MCNC: 102 MG/DL (ref 65–99)
HDLC SERPL-MCNC: 41 MG/DL
INTERPRETATION, 910389: NORMAL
LDLC SERPL CALC-MCNC: 94 MG/DL (ref 0–99)
POTASSIUM SERPL-SCNC: 4.6 MMOL/L (ref 3.5–5.2)
PROT SERPL-MCNC: 7.2 G/DL (ref 6–8.5)
SODIUM SERPL-SCNC: 140 MMOL/L (ref 134–144)
TRIGL SERPL-MCNC: 100 MG/DL (ref 0–149)
VLDLC SERPL CALC-MCNC: 20 MG/DL (ref 5–40)

## 2018-10-01 RX ORDER — FLUOXETINE HYDROCHLORIDE 20 MG/1
CAPSULE ORAL
Qty: 90 CAP | Refills: 3 | Status: SHIPPED | OUTPATIENT
Start: 2018-10-01 | End: 2018-12-20

## 2018-12-11 RX ORDER — SIMVASTATIN 40 MG/1
TABLET, FILM COATED ORAL
Qty: 90 TAB | Refills: 0 | Status: SHIPPED | OUTPATIENT
Start: 2018-12-11 | End: 2019-03-17 | Stop reason: SDUPTHER

## 2018-12-20 ENCOUNTER — OFFICE VISIT (OUTPATIENT)
Dept: FAMILY MEDICINE CLINIC | Age: 58
End: 2018-12-20

## 2018-12-20 VITALS
OXYGEN SATURATION: 97 % | DIASTOLIC BLOOD PRESSURE: 90 MMHG | WEIGHT: 248.6 LBS | RESPIRATION RATE: 16 BRPM | SYSTOLIC BLOOD PRESSURE: 116 MMHG | TEMPERATURE: 98.1 F | HEIGHT: 69 IN | HEART RATE: 82 BPM | BODY MASS INDEX: 36.82 KG/M2

## 2018-12-20 DIAGNOSIS — F33.1 MODERATE EPISODE OF RECURRENT MAJOR DEPRESSIVE DISORDER (HCC): Primary | ICD-10-CM

## 2018-12-20 RX ORDER — DULOXETIN HYDROCHLORIDE 20 MG/1
20 CAPSULE, DELAYED RELEASE ORAL DAILY
Qty: 90 CAP | Refills: 0 | Status: SHIPPED | OUTPATIENT
Start: 2018-12-20 | End: 2019-03-17 | Stop reason: SDUPTHER

## 2018-12-20 RX ORDER — FLUOXETINE HYDROCHLORIDE 20 MG/1
CAPSULE ORAL
Qty: 90 CAP | Refills: 0 | Status: SHIPPED | OUTPATIENT
Start: 2018-12-20 | End: 2019-03-18 | Stop reason: SDUPTHER

## 2018-12-20 RX ORDER — TRIAMCINOLONE ACETONIDE 1 MG/G
CREAM TOPICAL 2 TIMES DAILY
Qty: 60 G | Refills: 1 | Status: CANCELLED | OUTPATIENT
Start: 2018-12-20

## 2018-12-20 NOTE — PROGRESS NOTES
HISTORY OF PRESENT ILLNESS  Sarahi Dennison is a 65 y.o. male. HPI  Follow up MDD. Recently went up on prozac dose to 40mg daily for mild exacerbation of symptoms. C/o increased headaches on higher dose. Had consult with [address] [place] [address] [place]. Was told he would need to try 4 different antidepressants and 2 add on meds for treatment to be covered. Currently seeking psychiatry referral.  Would like to switch SSRI today or add alternative med. He has been intolerant to wellbutrin in the past.  Has been on prozac for over 18 years. Reports exacerbations are usually precipitated by stress at work or conflicts with family or co workers. Denies SI or HI. Has good support system of family/friends. Admits to not following a healthy diet and does not exercise regularly. Patient Active Problem List   Diagnosis Code    Acne L70.9    Sleep apnea G47.30    Depression- ++ Family Hx F32.9    Vitamin D deficiency E55.9    Allergic rhinitis J30.9    Eczema L30.9    Contact dermatitis- ketoconazole L25.9    Itching- skin, scalp L29.9    Itchy skin- entire body . x face-allergist and dermatologist treatments have not helped-started~2013 L29.9    Hyperlipidemia LDL goal <130 E78.5    Severe obesity (BMI 35.0-39. 9) E66.01     Current Outpatient Medications   Medication Sig    FLUoxetine (PROZAC) 20 mg capsule TAKE ONE CAPSULE BY MOUTH EVERY DAY    DULoxetine (CYMBALTA) 20 mg capsule Take 1 Cap by mouth daily.  simvastatin (ZOCOR) 40 mg tablet TAKE 1 TABLET BY MOUTH NIGHTLY.  loratadine (CLARITIN) 10 mg tablet Take 10 mg by mouth.  triamcinolone acetonide (KENALOG) 0.1 % topical cream Apply  to affected area two (2) times a day. use thin layer (Patient taking differently: Apply  to affected area as needed. use thin layer)    mometasone (ELOCON) 0.1 % topical cream APPLY TO AFFECTED AREA DAILY.  beta carotene 25,000 unit capsule Take 25,000 Units by mouth daily.     vitamin E (AQUA GEMS) 400 unit capsule Take  by mouth daily.  Cholecalciferol, Vitamin D3, (VITAMIN D3) 1,000 unit cap Take 2,000 Int'l Units/L by mouth.  vitamin a-vitamin c-vit e-min (OCUVITE) tablet Take 1 Tab by mouth daily. No current facility-administered medications for this visit. Social History     Tobacco Use    Smoking status: Former Smoker     Last attempt to quit: 7/15/1987     Years since quittin.4    Smokeless tobacco: Never Used   Substance Use Topics    Alcohol use: Yes     Alcohol/week: 2.5 oz     Types: 5 Cans of beer per week    Drug use: No     Visit Vitals  /90   Pulse 82   Temp 98.1 °F (36.7 °C) (Oral)   Resp 16   Ht 5' 9\" (1.753 m)   Wt 248 lb 9.6 oz (112.8 kg)   SpO2 97%   BMI 36.71 kg/m²         Review of Systems   Constitutional: Negative for weight loss. Respiratory: Negative for shortness of breath. Cardiovascular: Negative for chest pain and palpitations. Psychiatric/Behavioral: Positive for depression. Negative for hallucinations, substance abuse and suicidal ideas. The patient is not nervous/anxious and does not have insomnia. All other systems reviewed and are negative. Physical Exam   Constitutional: No distress. Overweight. Neck: Neck supple. Cardiovascular: Normal rate, regular rhythm and normal heart sounds. Pulmonary/Chest: Effort normal and breath sounds normal.   Lymphadenopathy:     He has no cervical adenopathy. Skin: Skin is warm and dry. Psychiatric: His behavior is normal. Thought content normal.   Flat affect. ASSESSMENT and PLAN  Diagnoses and all orders for this visit:    1. Moderate episode of recurrent major depressive disorder (HCC)  -     FLUoxetine (PROZAC) 20 mg capsule; TAKE ONE CAPSULE BY MOUTH EVERY DAY  -     DULoxetine (CYMBALTA) 20 mg capsule; Take 1 Cap by mouth daily. Mild exacerbation. Continue prozac at 20mg dose. Add small dose of cymbalta. Recommend psychiatry referral for further evaluation and treatment.   Discussed CBT, stress reduction and relaxation techniques. 2. BMI 36.0-36.9,adult  Reviewed healthy diet and exercise recommendations. Greater than 50% of 30 minute visit spent counseling regarding treatment options, [facility]. Follow up 4-6 weeks or sooner prn.

## 2019-02-03 DIAGNOSIS — L29.9 ITCHY SKIN: ICD-10-CM

## 2019-02-04 RX ORDER — MOMETASONE FUROATE 1 MG/G
CREAM TOPICAL DAILY
Qty: 60 G | Refills: 3 | Status: SHIPPED | OUTPATIENT
Start: 2019-02-04 | End: 2019-07-05

## 2019-02-04 RX ORDER — TRIAMCINOLONE ACETONIDE 1 MG/G
CREAM TOPICAL 2 TIMES DAILY
Qty: 60 G | Refills: 1 | Status: SHIPPED | OUTPATIENT
Start: 2019-02-04 | End: 2020-04-07

## 2019-02-18 ENCOUNTER — OFFICE VISIT (OUTPATIENT)
Dept: FAMILY MEDICINE CLINIC | Age: 59
End: 2019-02-18

## 2019-02-18 VITALS
OXYGEN SATURATION: 94 % | DIASTOLIC BLOOD PRESSURE: 80 MMHG | HEIGHT: 69 IN | BODY MASS INDEX: 36.73 KG/M2 | SYSTOLIC BLOOD PRESSURE: 128 MMHG | HEART RATE: 81 BPM | TEMPERATURE: 98.2 F | RESPIRATION RATE: 18 BRPM | WEIGHT: 248 LBS

## 2019-02-18 DIAGNOSIS — Z12.5 PROSTATE CANCER SCREENING: ICD-10-CM

## 2019-02-18 DIAGNOSIS — E66.01 SEVERE OBESITY WITH BODY MASS INDEX (BMI) OF 35.0 TO 39.9 WITH SERIOUS COMORBIDITY (HCC): ICD-10-CM

## 2019-02-18 DIAGNOSIS — E55.9 VITAMIN D DEFICIENCY: ICD-10-CM

## 2019-02-18 DIAGNOSIS — F33.9 RECURRENT MAJOR DEPRESSIVE DISORDER, REMISSION STATUS UNSPECIFIED (HCC): ICD-10-CM

## 2019-02-18 DIAGNOSIS — E78.5 HYPERLIPIDEMIA LDL GOAL <130: Primary | ICD-10-CM

## 2019-02-18 NOTE — PROGRESS NOTES
HISTORY OF PRESENT ILLNESS  HPI  Junior Pollock is a 63 y.o. Male with a history of eczema, contact dermatitis, sleep apnea, depression, vitamin D deficiency and hyperlipidemia with LDL goal <130, who presents at the office today for a follow up for his cholesterol. Pt is taking one capsule of vitamin D daily, but is unsure of the dose. Pt denies unusual SOB, chest pain, and any recent ER visits or hospitalizations. Past Medical History:   Diagnosis Date    Acne     Allergic rhinitis, cause unspecified 5/13/2014    Contact dermatitis- ketoconazole 1/20/2015    Contact dermatitis- ketoconazole 1/20/2015    Depression     Eczema 1/20/2015    Hyperlipidemia LDL goal <130 10/10/2017    Itchy skin- entire body . x face-allergist and dermatologist treatments have not helped-started~2013 10/10/2017    FARZANA (obstructive sleep apnea) 2006    cpap 12cm    Other and unspecified hyperlipidemia     Sleep apnea 8/2/2011    Vitamin D deficiency 5/13/2014     History reviewed. No pertinent surgical history. Current Outpatient Medications on File Prior to Visit   Medication Sig Dispense Refill    mometasone (ELOCON) 0.1 % topical cream Apply  to affected area daily. 60 g 3    triamcinolone acetonide (KENALOG) 0.1 % topical cream Apply  to affected area two (2) times a day. use thin layer 60 g 1    loratadine (CLARITIN) 10 mg tablet Take 10 mg by mouth.  beta carotene 25,000 unit capsule Take 25,000 Units by mouth daily.  vitamin E (AQUA GEMS) 400 unit capsule Take  by mouth daily.  Cholecalciferol, Vitamin D3, (VITAMIN D3) 1,000 unit cap Take 2,000 Int'l Units/L by mouth. No current facility-administered medications on file prior to visit.       No Known Allergies  Family History   Problem Relation Age of Onset    Diabetes Mother     Elevated Lipids Mother     Hypertension Mother     Diabetes Father     Heart Disease Father         MI in 68's    Stroke Father     Hypertension Father     Elevated Lipids Father     Heart Disease Brother         heart valve replacement     Social History     Socioeconomic History    Marital status:      Spouse name: Not on file    Number of children: Not on file    Years of education: Not on file    Highest education level: Not on file   Tobacco Use    Smoking status: Former Smoker     Last attempt to quit: 7/15/1987     Years since quittin.8    Smokeless tobacco: Never Used   Substance and Sexual Activity    Alcohol use: Yes     Alcohol/week: 2.5 oz     Types: 5 Cans of beer per week    Drug use: No    Sexual activity: Yes     Partners: Female   Other Topics Concern     Service Yes    Blood Transfusions No    Caffeine Concern No    Occupational Exposure No    Hobby Hazards No    Sleep Concern No    Stress Concern No    Weight Concern Yes    Special Diet No    Back Care Yes     Comment: occasional visits to chiropractor    Exercise No    Seat Belt Yes    Self-Exams No           Review of Systems   Constitutional: Negative for chills, diaphoresis, fever, malaise/fatigue and weight loss. Eyes: Negative for blurred vision, double vision, pain and redness. Respiratory: Negative for cough, shortness of breath and wheezing. Cardiovascular: Negative for chest pain, palpitations, orthopnea, claudication, leg swelling and PND. Skin: Negative for itching and rash. Neurological: Negative for dizziness, tingling, tremors, sensory change, speech change, focal weakness, seizures, loss of consciousness, weakness and headaches.      Results for orders placed or performed in visit on 19   LIPID PANEL   Result Value Ref Range    Cholesterol, total 168 100 - 199 mg/dL    Triglyceride 170 (H) 0 - 149 mg/dL    HDL Cholesterol 39 (L) >39 mg/dL    VLDL, calculated 34 5 - 40 mg/dL    LDL, calculated 95 0 - 99 mg/dL   METABOLIC PANEL, COMPREHENSIVE   Result Value Ref Range    Glucose 102 (H) 65 - 99 mg/dL    BUN 16 6 - 24 mg/dL    Creatinine 1.17 0.76 - 1.27 mg/dL    GFR est non-AA 69 >59 mL/min/1.73    GFR est AA 80 >59 mL/min/1.73    BUN/Creatinine ratio 14 9 - 20    Sodium 143 134 - 144 mmol/L    Potassium 4.5 3.5 - 5.2 mmol/L    Chloride 102 96 - 106 mmol/L    CO2 24 20 - 29 mmol/L    Calcium 9.1 8.7 - 10.2 mg/dL    Protein, total 7.3 6.0 - 8.5 g/dL    Albumin 4.8 3.5 - 5.5 g/dL    GLOBULIN, TOTAL 2.5 1.5 - 4.5 g/dL    A-G Ratio 1.9 1.2 - 2.2    Bilirubin, total 0.4 0.0 - 1.2 mg/dL    Alk. phosphatase 65 39 - 117 IU/L    AST (SGOT) 24 0 - 40 IU/L    ALT (SGPT) 30 0 - 44 IU/L   VITAMIN D, 25 HYDROXY   Result Value Ref Range    VITAMIN D, 25-HYDROXY 37.5 30.0 - 100.0 ng/mL   PSA W/ REFLX FREE PSA   Result Value Ref Range    Prostate Specific Ag 0.4 0.0 - 4.0 ng/mL    Reflex Criteria Comment    CVD REPORT   Result Value Ref Range    INTERPRETATION Note          Physical Exam  Visit Vitals  /80 (BP 1 Location: Left arm, BP Patient Position: Sitting)   Pulse 81   Temp 98.2 °F (36.8 °C) (Oral)   Resp 18   Ht 5' 9\" (1.753 m)   Wt 248 lb (112.5 kg)   SpO2 94%   BMI 36.62 kg/m²     Head: Normocephalic, without obvious abnormality, atraumatic  Eyes: conjunctivae/corneas clear. PERRL, EOM's intact. Neck: supple, symmetrical, trachea midline, no adenopathy, thyroid: not enlarged, symmetric, no tenderness/mass/nodules, no carotid bruit and no JVD  Lungs: clear to auscultation bilaterally  Heart: regular rate and rhythm, S1, S2 normal, no murmur, click, rub or gallop  Extremities: extremities normal, atraumatic, no cyanosis or edema  Pulses: 2+ and symmetric  Lymph nodes: Cervical, supraclavicular, and axillary nodes normal.  Neurologic: Grossly normal      ASSESSMENT and PLAN    ICD-10-CM ICD-9-CM    1. Hyperlipidemia LDL goal <130 E78.5 272.4 LIPID PANEL      METABOLIC PANEL, COMPREHENSIVE   2. Vitamin D deficiency E55.9 268.9 VITAMIN D, 25 HYDROXY   3. Prostate cancer screening Z12.5 V76.44 PSA W/ REFLX FREE PSA   4.  Severe obesity with body mass index (BMI) of 35.0 to 39.9 with serious comorbidity (HCC) E66.01 278.01    5. Recurrent major depressive disorder, remission status unspecified (HCC) F33.9 296.30    6. BMI 37.0-37.9, adult Z68.37 V85.37      Diagnoses and all orders for this visit:    1. Hyperlipidemia LDL goal <130  -     LIPID PANEL  -     METABOLIC PANEL, COMPREHENSIVE    2. Vitamin D deficiency  -     VITAMIN D, 25 HYDROXY    3. Prostate cancer screening  -     PSA W/ REFLX FREE PSA    4. Severe obesity with body mass index (BMI) of 35.0 to 39.9 with serious comorbidity [facility])  Assessment & Plan:  Improving, based on history, physical exam and review of pertinent labs, studies and medications; meds reconciled; continue current treatment plan, lifestyle modifications recommended. Key Obesity Meds     Patient is on no anti-obesity meds. Lab Results   Component Value Date/Time    Glucose 102 02/18/2019 08:46 AM    Cholesterol, total 168 02/18/2019 08:46 AM    HDL Cholesterol 39 02/18/2019 08:46 AM    LDL, calculated 95 02/18/2019 08:46 AM    Triglyceride 170 02/18/2019 08:46 AM    Sodium 143 02/18/2019 08:46 AM    Potassium 4.5 02/18/2019 08:46 AM    ALT (SGPT) 30 02/18/2019 08:46 AM    AST (SGOT) 24 02/18/2019 08:46 AM    VITAMIN D, 25-HYDROXY 37.5 02/18/2019 08:46 AM             5. Recurrent major depressive disorder, remission status unspecified ([facility] [address]    6. BMI 37.0-37.9, adult    Other orders  -     CVD REPORT    Follow-up and Dispositions    · Return in about 6 months (around 8/18/2019), or weight gain of > 5 lbs., for F/U cholesterol, f/u Vitamin D deficiency, F/U of obesity. lab results and schedule of future lab studies reviewed with patient  reviewed diet, exercise and weight control  cardiovascular risk and specific lipid/LDL goals reviewed  reviewed medications and side effects in detail  Please call my office if there are any questions- [phone].   I will arrange for follow up after the lab tests done from today return  Recommended a weekly \"heart check. \" I went into detail how to do this. Call for refills on medications as needed. Discussed expected course/resolution/complications of diagnosis in detail with patient. Medication risks/benefits/costs/interactions/alternatives discussed with patient. Pt was given an after visit summary which includes diagnoses, current medications & vitals. Pt expressed understanding with the diagnosis and plan. Total 25 minutes,60 % counseling re: Recommended a weekly \"heart check. \" I went into detail how to do this. Regular exercise is very important to your health; it helps mentally, physically, socially; it prevents injuries if done properly. Exercise, even as simple as walking 20-30 minutes daily has major benefits to your health even though your \"numbers\" are the same in the lab. See if you can add this into your daily regimen and after a few months it will become a regular habit-\"just something you do,\" like brushing your teeth. A combination of aerobic exercise and strengthening and stretching is felt to be the best for you, so this should be your ultimate goal.   This can be done in the privacy of your home or in a group setting as at the gym  Some prefer having a , others prefer to do exercise in groups or individually. Do what \"works\" for you. You need to make it simple and \"fun,\" or you most likely you will not continue it. BMI is significantly elevated- in the obese range. I reviewed diet, exercise and weight control. Discussed weight control in detail, the importance of mainly decreased carbs, and for weight maintenance, exercise; discussed different diets and that it isn't as important to watch the type of foods as it is to decrease calorie intake no matter what type of diet you do, etc.       If good Vit D level, recheck yearly and continue same Vit D intake lifelong. Reviewed symptoms, or lack thereof, of elevated cholesterol. Also, discussed symptoms of concern that were noted today in the note above, treatment options( including doing nothing), when to follow up before recommended time frame. Also, answered all questions. This document was written by Arben Oneal, as dictated by Saintclair Ma, MD.  I have reviewed and agree with the above note and have made corrections where appropriate José Manuel Oreilly M.D.

## 2019-02-18 NOTE — PROGRESS NOTES
Chief Complaint   Patient presents with    Cholesterol Problem     fasting follow up        1. Have you been to the ER, urgent care clinic since your last visit? Hospitalized since your last visit? No    2. Have you seen or consulted any other health care providers outside of the [address] [place] [address] since your last visit? Include any pap smears or colon screening.  No

## 2019-02-19 LAB
25(OH)D3+25(OH)D2 SERPL-MCNC: 37.5 NG/ML (ref 30–100)
ALBUMIN SERPL-MCNC: 4.8 G/DL (ref 3.5–5.5)
ALBUMIN/GLOB SERPL: 1.9 {RATIO} (ref 1.2–2.2)
ALP SERPL-CCNC: 65 IU/L (ref 39–117)
ALT SERPL-CCNC: 30 IU/L (ref 0–44)
AST SERPL-CCNC: 24 IU/L (ref 0–40)
BILIRUB SERPL-MCNC: 0.4 MG/DL (ref 0–1.2)
BUN SERPL-MCNC: 16 MG/DL (ref 6–24)
BUN/CREAT SERPL: 14 (ref 9–20)
CALCIUM SERPL-MCNC: 9.1 MG/DL (ref 8.7–10.2)
CHLORIDE SERPL-SCNC: 102 MMOL/L (ref 96–106)
CHOLEST SERPL-MCNC: 168 MG/DL (ref 100–199)
CO2 SERPL-SCNC: 24 MMOL/L (ref 20–29)
CREAT SERPL-MCNC: 1.17 MG/DL (ref 0.76–1.27)
GLOBULIN SER CALC-MCNC: 2.5 G/DL (ref 1.5–4.5)
GLUCOSE SERPL-MCNC: 102 MG/DL (ref 65–99)
HDLC SERPL-MCNC: 39 MG/DL
INTERPRETATION, 910389: NORMAL
LDLC SERPL CALC-MCNC: 95 MG/DL (ref 0–99)
POTASSIUM SERPL-SCNC: 4.5 MMOL/L (ref 3.5–5.2)
PROT SERPL-MCNC: 7.3 G/DL (ref 6–8.5)
PSA SERPL-MCNC: 0.4 NG/ML (ref 0–4)
REFLEX CRITERIA: NORMAL
SODIUM SERPL-SCNC: 143 MMOL/L (ref 134–144)
TRIGL SERPL-MCNC: 170 MG/DL (ref 0–149)
VLDLC SERPL CALC-MCNC: 34 MG/DL (ref 5–40)

## 2019-03-17 DIAGNOSIS — F33.1 MODERATE EPISODE OF RECURRENT MAJOR DEPRESSIVE DISORDER (HCC): ICD-10-CM

## 2019-03-18 ENCOUNTER — OFFICE VISIT (OUTPATIENT)
Dept: FAMILY MEDICINE CLINIC | Age: 59
End: 2019-03-18

## 2019-03-18 VITALS
WEIGHT: 252.6 LBS | BODY MASS INDEX: 37.41 KG/M2 | TEMPERATURE: 98.3 F | HEART RATE: 89 BPM | RESPIRATION RATE: 16 BRPM | DIASTOLIC BLOOD PRESSURE: 79 MMHG | OXYGEN SATURATION: 95 % | HEIGHT: 69 IN | SYSTOLIC BLOOD PRESSURE: 127 MMHG

## 2019-03-18 DIAGNOSIS — F33.1 MDD (MAJOR DEPRESSIVE DISORDER), RECURRENT EPISODE, MODERATE (HCC): Primary | ICD-10-CM

## 2019-03-18 RX ORDER — DULOXETIN HYDROCHLORIDE 20 MG/1
CAPSULE, DELAYED RELEASE ORAL
Qty: 90 CAP | Refills: 0 | Status: SHIPPED | OUTPATIENT
Start: 2019-03-18 | End: 2019-03-18

## 2019-03-18 RX ORDER — VENLAFAXINE HYDROCHLORIDE 75 MG/1
75 CAPSULE, EXTENDED RELEASE ORAL DAILY
Qty: 90 CAP | Refills: 0 | Status: SHIPPED | OUTPATIENT
Start: 2019-03-18 | End: 2019-06-08 | Stop reason: SDUPTHER

## 2019-03-18 RX ORDER — FLUOXETINE HYDROCHLORIDE 20 MG/1
CAPSULE ORAL
Qty: 90 CAP | Refills: 0 | Status: SHIPPED | OUTPATIENT
Start: 2019-03-18 | End: 2019-08-05

## 2019-03-18 RX ORDER — SIMVASTATIN 40 MG/1
TABLET, FILM COATED ORAL
Qty: 90 TAB | Refills: 0 | Status: SHIPPED | OUTPATIENT
Start: 2019-03-18 | End: 2019-06-08 | Stop reason: SDUPTHER

## 2019-03-18 NOTE — PROGRESS NOTES
HISTORY OF PRESENT ILLNESS  Corazon Chang is a 63 y.o. male. HPI  Follow up depression. Reports he is attempting to obtain insurance coverage for [facility] treatment but must try several antidepressants before treatment will be covered. At last OV three months ago, cymbalta was added to prozac. Patient reports he has been taking cymbalta but stopped the prozac. Not feeling much better with cymbalta, has some good days and bad days. Stress level at work has improved. Denies SI or HI. Sees a therapist regularly, was advised to see psychiatrist to assist with med management. Reports he has not been able to get in with a psychiatrist.  Patient Active Problem List   Diagnosis Code    Acne L70.9    Sleep apnea G47.30    Depression- ++ Family Hx F32.9    Vitamin D deficiency E55.9    Allergic rhinitis J30.9    Eczema L30.9    Contact dermatitis- ketoconazole L25.9    Itching- skin, scalp L29.9    Itchy skin- entire body . x face-allergist and dermatologist treatments have not helped-started~2013 L29.9    Hyperlipidemia LDL goal <130 E78.5    Severe obesity (BMI 35.0-39. 9) E66.01     Current Outpatient Medications   Medication Sig    simvastatin (ZOCOR) 40 mg tablet TAKE 1 TABLET BY MOUTH NIGHTLY.  FLUoxetine (PROZAC) 20 mg capsule TAKE ONE CAPSULE BY MOUTH EVERY DAY    venlafaxine-SR (EFFEXOR-XR) 75 mg capsule Take 1 Cap by mouth daily.  mometasone (ELOCON) 0.1 % topical cream Apply  to affected area daily.  triamcinolone acetonide (KENALOG) 0.1 % topical cream Apply  to affected area two (2) times a day. use thin layer    loratadine (CLARITIN) 10 mg tablet Take 10 mg by mouth.  vitamin a-vitamin c-vit e-min (OCUVITE) tablet Take 1 Tab by mouth daily.  beta carotene 25,000 unit capsule Take 25,000 Units by mouth daily.  vitamin E (AQUA GEMS) 400 unit capsule Take  by mouth daily.  Cholecalciferol, Vitamin D3, (VITAMIN D3) 1,000 unit cap Take 2,000 Int'l Units/L by mouth.      No current facility-administered medications for this visit. Social History     Tobacco Use    Smoking status: Former Smoker     Last attempt to quit: 7/15/1987     Years since quittin.6    Smokeless tobacco: Never Used   Substance Use Topics    Alcohol use: Yes     Alcohol/week: 2.5 oz     Types: 5 Cans of beer per week    Drug use: No     Visit Vitals  /79 (BP 1 Location: Left arm, BP Patient Position: Sitting)   Pulse 89   Temp 98.3 °F (36.8 °C) (Oral)   Resp 16   Ht 5' 9\" (1.753 m)   Wt 252 lb 9.6 oz (114.6 kg)   SpO2 95%   BMI 37.30 kg/m²     Review of Systems   Constitutional: Negative for weight loss. Respiratory: Negative. Cardiovascular: Negative. Psychiatric/Behavioral: Positive for depression. Negative for hallucinations, substance abuse and suicidal ideas. The patient is not nervous/anxious and does not have insomnia. All other systems reviewed and are negative. Physical Exam   Constitutional: No distress. Neck: Neck supple. Cardiovascular: Normal rate, regular rhythm and normal heart sounds. Pulmonary/Chest: Effort normal and breath sounds normal.   Lymphadenopathy:     He has no cervical adenopathy. Skin: Skin is warm and dry. Psychiatric: His behavior is normal. Thought content normal.   Fairly flat affect. ASSESSMENT and PLAN  Diagnoses and all orders for this visit:    1. MDD (major depressive disorder), recurrent episode, moderate (HCC)  -     FLUoxetine (PROZAC) 20 mg capsule; TAKE ONE CAPSULE BY MOUTH EVERY DAY  -     venlafaxine-SR (EFFEXOR-XR) 75 mg capsule; Take 1 Cap by mouth daily. Little improvement with cymbalta. Will switch to effexor XR as directed. Medication risks, benefits and potential side effects were reviewed. Strongly advised to consult [address] [place] [address] resource person to re assess coverage approval.  Advised to seek psychiatry consult for medication management. Follow up 3 months or sooner prn.

## 2019-03-18 NOTE — PROGRESS NOTES
Leroy Royal is a 63 y.o. male    Exam Rm: 28    Chief Complaint   Patient presents with    Medication Evaluation     Pt is here for a medication evaluation for Duloxetene. 1. Have you been to the ER, urgent care clinic since your last visit? Hospitalized since your last visit? No    2. Have you seen or consulted any other health care providers outside of the [address] [place] [address] since your last visit? Include any pap smears or colon screening.   No    Health Maintenance Due   Topic Date Due    Shingrix Vaccine Age 49> (1 of 2) 12/21/2011       Visit Vitals  /79 (BP 1 Location: Left arm, BP Patient Position: Sitting)   Pulse 89   Temp 98.3 °F (36.8 °C) (Oral)   Resp 16   Ht 5' 9\" (1.753 m)   Wt 252 lb 9.6 oz (114.6 kg)   SpO2 95%   BMI 37.30 kg/m²

## 2019-05-16 NOTE — ASSESSMENT & PLAN NOTE
Improving, based on history, physical exam and review of pertinent labs, studies and medications; meds reconciled; continue current treatment plan, lifestyle modifications recommended. Key Obesity Meds     Patient is on no anti-obesity meds.         Lab Results   Component Value Date/Time    Glucose 102 02/18/2019 08:46 AM    Cholesterol, total 168 02/18/2019 08:46 AM    HDL Cholesterol 39 02/18/2019 08:46 AM    LDL, calculated 95 02/18/2019 08:46 AM    Triglyceride 170 02/18/2019 08:46 AM    Sodium 143 02/18/2019 08:46 AM    Potassium 4.5 02/18/2019 08:46 AM    ALT (SGPT) 30 02/18/2019 08:46 AM    AST (SGOT) 24 02/18/2019 08:46 AM    VITAMIN D, 25-HYDROXY 37.5 02/18/2019 08:46 AM

## 2019-06-08 DIAGNOSIS — F33.1 MDD (MAJOR DEPRESSIVE DISORDER), RECURRENT EPISODE, MODERATE (HCC): ICD-10-CM

## 2019-06-08 RX ORDER — SIMVASTATIN 40 MG/1
TABLET, FILM COATED ORAL
Qty: 90 TAB | Refills: 0 | Status: SHIPPED | OUTPATIENT
Start: 2019-06-08 | End: 2019-08-05

## 2019-06-09 RX ORDER — VENLAFAXINE HYDROCHLORIDE 75 MG/1
CAPSULE, EXTENDED RELEASE ORAL
Qty: 90 CAP | Refills: 0 | Status: SHIPPED | OUTPATIENT
Start: 2019-06-09 | End: 2019-07-05

## 2019-06-27 NOTE — PROGRESS NOTES
He has seen these in [address] [place] [address]!! All of your recent lab tests are normal or at goal.  No diabetes, normal liver, PSA, Vitamin D, and kidney tests. I would continue everything the same and schedule your next fasting office visit in 6 months.

## 2019-07-05 ENCOUNTER — OFFICE VISIT (OUTPATIENT)
Dept: FAMILY MEDICINE CLINIC | Age: 59
End: 2019-07-05

## 2019-07-05 ENCOUNTER — TELEPHONE (OUTPATIENT)
Dept: FAMILY MEDICINE CLINIC | Age: 59
End: 2019-07-05

## 2019-07-05 VITALS
RESPIRATION RATE: 18 BRPM | HEART RATE: 65 BPM | BODY MASS INDEX: 34.07 KG/M2 | HEIGHT: 69 IN | WEIGHT: 230 LBS | OXYGEN SATURATION: 95 % | DIASTOLIC BLOOD PRESSURE: 75 MMHG | TEMPERATURE: 97.7 F | SYSTOLIC BLOOD PRESSURE: 129 MMHG

## 2019-07-05 DIAGNOSIS — R21 RASH: ICD-10-CM

## 2019-07-05 DIAGNOSIS — F33.1 MDD (MAJOR DEPRESSIVE DISORDER), RECURRENT EPISODE, MODERATE (HCC): Primary | ICD-10-CM

## 2019-07-05 DIAGNOSIS — R21 RASH: Primary | ICD-10-CM

## 2019-07-05 DIAGNOSIS — E78.5 HYPERLIPIDEMIA LDL GOAL <130: ICD-10-CM

## 2019-07-05 RX ORDER — TRIAMCINOLONE ACETONIDE 5 MG/G
OINTMENT TOPICAL 2 TIMES DAILY
Qty: 30 G | Refills: 0 | Status: SHIPPED | OUTPATIENT
Start: 2019-07-05 | End: 2020-06-08 | Stop reason: SDUPTHER

## 2019-07-05 RX ORDER — SERTRALINE HYDROCHLORIDE 50 MG/1
50 TABLET, FILM COATED ORAL DAILY
Qty: 30 TAB | Refills: 1 | Status: SHIPPED | OUTPATIENT
Start: 2019-07-05 | End: 2019-07-29 | Stop reason: SDUPTHER

## 2019-07-05 RX ORDER — SERTRALINE HYDROCHLORIDE 50 MG/1
50 TABLET, FILM COATED ORAL DAILY
Qty: 30 TAB | Refills: 1 | Status: CANCELLED | OUTPATIENT
Start: 2019-07-05

## 2019-07-05 RX ORDER — KETOCONAZOLE 2 G/100G
AEROSOL, FOAM TOPICAL 2 TIMES DAILY
Qty: 100 G | Refills: 0 | Status: SHIPPED | OUTPATIENT
Start: 2019-07-05 | End: 2019-07-05

## 2019-07-05 RX ORDER — NYSTATIN 100000 U/G
OINTMENT TOPICAL 2 TIMES DAILY
Qty: 30 G | Refills: 0 | Status: SHIPPED | OUTPATIENT
Start: 2019-07-05 | End: 2019-10-21 | Stop reason: SDUPTHER

## 2019-07-05 NOTE — PROGRESS NOTES
HISTORY OF PRESENT ILLNESS  Patricia Arrington is a 63 y.o. male. HPI: Patient comes in and requesting to change his antidepressant. He has history of depression, hyperlipidemia, itchy skin with rash and sleep apnea. He is followed by psychologist. He states that he has taken several different antidepressant without help. He has taken Wellbutrin, Cymbalta in the past. Currently he is taking Effexor and Prozac. He was suggested to to [address] [place] [address] treatment but it requires him to fail four different medications. Past Medical History:   Diagnosis Date    Acne     Allergic rhinitis, cause unspecified 5/13/2014    Contact dermatitis- ketoconazole 1/20/2015    Contact dermatitis- ketoconazole 1/20/2015    Depression     Eczema 1/20/2015    Hyperlipidemia LDL goal <130 10/10/2017    Itchy skin- entire body . x face-allergist and dermatologist treatments have not helped-started~2013 10/10/2017    FARZANA (obstructive sleep apnea) 2006    cpap 12cm    Other and unspecified hyperlipidemia     Sleep apnea 8/2/2011    Vitamin D deficiency 5/13/2014   History reviewed. No pertinent surgical history. No Known Allergies    Current Outpatient Medications:     triamcinolone acetonide (KENALOG) 0.5 % ointment, Apply  to affected area two (2) times a day. use thin layer, Disp: 30 g, Rfl: 0    sertraline (ZOLOFT) 50 mg tablet, Take 1 Tab by mouth daily. , Disp: 30 Tab, Rfl: 1    Ketoconazole 2 % topical foam, Apply  to affected area two (2) times a day., Disp: 100 g, Rfl: 0    simvastatin (ZOCOR) 40 mg tablet, TAKE 1 TABLET BY MOUTH NIGHTLY., Disp: 90 Tab, Rfl: 0    FLUoxetine (PROZAC) 20 mg capsule, TAKE ONE CAPSULE BY MOUTH EVERY DAY, Disp: 90 Cap, Rfl: 0    mometasone (ELOCON) 0.1 % topical cream, Apply  to affected area daily. , Disp: 60 g, Rfl: 3    triamcinolone acetonide (KENALOG) 0.1 % topical cream, Apply  to affected area two (2) times a day.  use thin layer, Disp: 60 g, Rfl: 1    loratadine (CLARITIN) 10 mg tablet, Take 10 mg by mouth., Disp: , Rfl:     beta carotene 25,000 unit capsule, Take 25,000 Units by mouth daily. , Disp: , Rfl:     vitamin E (AQUA GEMS) 400 unit capsule, Take  by mouth daily. , Disp: , Rfl:     Cholecalciferol, Vitamin D3, (VITAMIN D3) 1,000 unit cap, Take 2,000 Int'l Units/L by mouth., Disp: , Rfl:   Review of Systems   Constitutional: Negative. Respiratory: Negative. Cardiovascular: Negative. Gastrointestinal: Negative. Skin: Positive for itching and rash. Psychiatric/Behavioral: Positive for depression. Blood pressure 129/75, pulse 65, temperature 97.7 °F (36.5 °C), temperature source Oral, resp. rate 18, height 5' 9\" (1.753 m), weight 230 lb (104.3 kg), SpO2 95 %. Physical Exam   Constitutional: No distress. HENT:   Mouth/Throat: Oropharynx is clear and moist.   Neck: Normal range of motion. Neck supple. Cardiovascular: Normal rate and regular rhythm. No murmur heard. Pulmonary/Chest: Effort normal and breath sounds normal.   Abdominal: Soft. Bowel sounds are normal.   Skin: There is erythema. Erythematous rash in upper section of buttocks crack     Psychiatric: He has a normal mood and affect. His behavior is normal.   Nursing note and vitals reviewed. ASSESSMENT and PLAN  Diagnoses and all orders for this visit:    1. MDD (major depressive disorder), recurrent episode, moderate (HCC)  -     sertraline (ZOLOFT) 50 mg tablet; Take 1 Tab by mouth daily.        -    Stopped Effexor advised to continue with Prozac for another week        -    Then stop the Prozac and start taking Zoloft 1/2 tab for a week, increase it to 1 tab daily        -    Follow up in 1 month        -    pharmacist consulted on waning the Prozac    2. Hyperlipidemia LDL goal <130  -     LIPID PANEL  -     METABOLIC PANEL, COMPREHENSIVE    3. Rash  -     triamcinolone acetonide (KENALOG) 0.5 % ointment; Apply  to affected area two (2) times a day.  use thin layer  -     Ketoconazole 2 % topical foam; Apply  to affected area two (2) times a day.   Pt was given an after visit summary which includes diagnosis, current medicines and vital and voiced understanding of treatment plan

## 2019-07-05 NOTE — PROGRESS NOTES
Chief Complaint   Patient presents with    Medication Evaluation     1. Have you been to the ER, urgent care clinic since your last visit? Hospitalized since your last visit? No    2. Have you seen or consulted any other health care providers outside of the [address] [place] [address] since your last visit? Include any pap smears or colon screening.  No

## 2019-07-05 NOTE — TELEPHONE ENCOUNTER
BERENICE Leslie LPN   Caller: Unspecified (Today, 12:37 PM)             Medication changed to nystatin, pharmacy called

## 2019-07-05 NOTE — TELEPHONE ENCOUNTER
Pharmacy requesting alternative medication     Current medication Ketoconazole 2 % topical foam     Pharmacy Comments: Not Covered, Cream is covered    Pharmacy on file verified  [phone])

## 2019-07-06 LAB
ALBUMIN SERPL-MCNC: 4.6 G/DL (ref 3.5–5.5)
ALBUMIN/GLOB SERPL: 2 {RATIO} (ref 1.2–2.2)
ALP SERPL-CCNC: 70 IU/L (ref 39–117)
ALT SERPL-CCNC: 21 IU/L (ref 0–44)
AST SERPL-CCNC: 20 IU/L (ref 0–40)
BILIRUB SERPL-MCNC: 0.2 MG/DL (ref 0–1.2)
BUN SERPL-MCNC: 11 MG/DL (ref 6–24)
BUN/CREAT SERPL: 10 (ref 9–20)
CALCIUM SERPL-MCNC: 9.2 MG/DL (ref 8.7–10.2)
CHLORIDE SERPL-SCNC: 106 MMOL/L (ref 96–106)
CHOLEST SERPL-MCNC: 143 MG/DL (ref 100–199)
CO2 SERPL-SCNC: 24 MMOL/L (ref 20–29)
CREAT SERPL-MCNC: 1.13 MG/DL (ref 0.76–1.27)
GLOBULIN SER CALC-MCNC: 2.3 G/DL (ref 1.5–4.5)
GLUCOSE SERPL-MCNC: 93 MG/DL (ref 65–99)
HDLC SERPL-MCNC: 40 MG/DL
INTERPRETATION, 910389: NORMAL
LDLC SERPL CALC-MCNC: 91 MG/DL (ref 0–99)
POTASSIUM SERPL-SCNC: 4.5 MMOL/L (ref 3.5–5.2)
PROT SERPL-MCNC: 6.9 G/DL (ref 6–8.5)
SODIUM SERPL-SCNC: 143 MMOL/L (ref 134–144)
TRIGL SERPL-MCNC: 62 MG/DL (ref 0–149)
VLDLC SERPL CALC-MCNC: 12 MG/DL (ref 5–40)

## 2019-07-29 DIAGNOSIS — F33.1 MDD (MAJOR DEPRESSIVE DISORDER), RECURRENT EPISODE, MODERATE (HCC): ICD-10-CM

## 2019-07-29 NOTE — TELEPHONE ENCOUNTER
Pharmacy requesting 90 day supply     Requested Prescriptions     Pending Prescriptions Disp Refills    sertraline (ZOLOFT) 50 mg tablet 30 Tab 1     Sig: Take 1 Tab by mouth daily.      Medication was submitted to pharmacy on 07/05/2019 for 30tab and 1 refill     Pharmacy on file verified  ([facility] [phone])

## 2019-07-30 RX ORDER — SERTRALINE HYDROCHLORIDE 50 MG/1
50 TABLET, FILM COATED ORAL DAILY
Qty: 90 TAB | Refills: 1 | Status: SHIPPED | OUTPATIENT
Start: 2019-07-30 | End: 2019-08-05 | Stop reason: SDUPTHER

## 2019-08-05 ENCOUNTER — OFFICE VISIT (OUTPATIENT)
Dept: FAMILY MEDICINE CLINIC | Age: 59
End: 2019-08-05

## 2019-08-05 VITALS
HEART RATE: 74 BPM | SYSTOLIC BLOOD PRESSURE: 130 MMHG | HEIGHT: 69 IN | RESPIRATION RATE: 16 BRPM | DIASTOLIC BLOOD PRESSURE: 81 MMHG | TEMPERATURE: 98 F | BODY MASS INDEX: 33.38 KG/M2 | OXYGEN SATURATION: 97 % | WEIGHT: 225.4 LBS

## 2019-08-05 DIAGNOSIS — F33.1 MDD (MAJOR DEPRESSIVE DISORDER), RECURRENT EPISODE, MODERATE (HCC): Primary | ICD-10-CM

## 2019-08-05 DIAGNOSIS — B36.9 FUNGAL INFECTION OF SKIN: ICD-10-CM

## 2019-08-05 DIAGNOSIS — E78.5 HYPERLIPIDEMIA LDL GOAL <130: ICD-10-CM

## 2019-08-05 RX ORDER — SERTRALINE HYDROCHLORIDE 50 MG/1
50 TABLET, FILM COATED ORAL DAILY
Qty: 90 TAB | Refills: 1 | Status: SHIPPED | OUTPATIENT
Start: 2019-08-05 | End: 2019-10-21

## 2019-08-05 RX ORDER — SIMVASTATIN 20 MG/1
20 TABLET, FILM COATED ORAL
Qty: 90 TAB | Refills: 0 | Status: SHIPPED | OUTPATIENT
Start: 2019-08-05 | End: 2019-10-31 | Stop reason: SDUPTHER

## 2019-08-05 NOTE — PROGRESS NOTES
HISTORY OF PRESENT ILLNESS  Daniella Arriola is a 63 y.o. male. HPI: Depression: Four weeks follow up on anxiety, reports that Zoloft 50 mg is working and requesting ninety day supply. His cholesterol is good , takes Zocor 40 mg, would like to go down to 20 mg. Patient states that he has been watching his diet and that he doesn't need higher dose of Zocor. Groin rash :He reports as soon as he applied nystatin to groin the rash got better  Past Medical History:   Diagnosis Date    Acne     Allergic rhinitis, cause unspecified 5/13/2014    Contact dermatitis- ketoconazole 1/20/2015    Contact dermatitis- ketoconazole 1/20/2015    Depression     Eczema 1/20/2015    Hyperlipidemia LDL goal <130 10/10/2017    Itchy skin- entire body . x face-allergist and dermatologist treatments have not helped-started~2013 10/10/2017    FARZANA (obstructive sleep apnea) 2006    cpap 12cm    Other and unspecified hyperlipidemia     Sleep apnea 8/2/2011    Vitamin D deficiency 5/13/2014   History reviewed. No pertinent surgical history. No Known Allergies    Current Outpatient Medications:     sertraline (ZOLOFT) 50 mg tablet, Take 1 Tab by mouth daily. , Disp: 90 Tab, Rfl: 1    simvastatin (ZOCOR) 20 mg tablet, Take 1 Tab by mouth nightly., Disp: 90 Tab, Rfl: 0    triamcinolone acetonide (KENALOG) 0.1 % topical cream, Apply  to affected area two (2) times a day. use thin layer, Disp: 60 g, Rfl: 1    loratadine (CLARITIN) 10 mg tablet, Take 10 mg by mouth., Disp: , Rfl:     beta carotene 25,000 unit capsule, Take 25,000 Units by mouth daily. , Disp: , Rfl:     vitamin E (AQUA GEMS) 400 unit capsule, Take  by mouth daily. , Disp: , Rfl:     Cholecalciferol, Vitamin D3, (VITAMIN D3) 1,000 unit cap, Take 2,000 Int'l Units/L by mouth., Disp: , Rfl:     triamcinolone acetonide (KENALOG) 0.5 % ointment, Apply  to affected area two (2) times a day.  use thin layer, Disp: 30 g, Rfl: 0    nystatin (MYCOSTATIN) 100,000 unit/gram ointment, Apply  to affected area two (2) times a day., Disp: 30 g, Rfl: 0  Review of Systems   Constitutional: Negative. Respiratory: Negative. Cardiovascular: Negative. Gastrointestinal: Negative. Psychiatric/Behavioral: Positive for depression. Blood pressure 130/81, pulse 74, temperature 98 °F (36.7 °C), temperature source Oral, resp. rate 16, height 5' 9\" (1.753 m), weight 225 lb 6.4 oz (102.2 kg), SpO2 97 %. Body mass index is 33.29 kg/m². Physical Exam   Constitutional: No distress. HENT:   Mouth/Throat: Oropharynx is clear and moist.   Neck: Normal range of motion. Neck supple. Cardiovascular: Normal rate and regular rhythm. No murmur heard. Pulmonary/Chest: Effort normal and breath sounds normal.   Abdominal: Soft. Bowel sounds are normal.   Psychiatric: He has a normal mood and affect. His behavior is normal.   Nursing note and vitals reviewed. ASSESSMENT and PLAN  Diagnoses and all orders for this visit:    1. MDD (major depressive disorder), recurrent episode, moderate (HCC)  -     sertraline (ZOLOFT) 50 mg tablet; Take 1 Tab by mouth daily. 2. Hyperlipidemia LDL goal <130  -     simvastatin (ZOCOR) 20 mg tablet; Take 1 Tab by mouth nightly.     3. Fungal infection of skin has resolved  Follow up in six months  Pt was given an after visit summary which includes diagnosis, current medicines and vital and voiced understanding of treatment plan

## 2019-08-05 NOTE — PROGRESS NOTES
Chief Complaint   Patient presents with    Depression     4 wk f/u     1. Have you been to the ER, urgent care clinic since your last visit? Hospitalized since your last visit? No    2. Have you seen or consulted any other health care providers outside of the [address] [place] [address] since your last visit? Include any pap smears or colon screening.  No

## 2019-10-21 ENCOUNTER — OFFICE VISIT (OUTPATIENT)
Dept: FAMILY MEDICINE CLINIC | Age: 59
End: 2019-10-21

## 2019-10-21 VITALS
DIASTOLIC BLOOD PRESSURE: 86 MMHG | HEIGHT: 69 IN | OXYGEN SATURATION: 97 % | TEMPERATURE: 98.9 F | WEIGHT: 218.4 LBS | SYSTOLIC BLOOD PRESSURE: 122 MMHG | BODY MASS INDEX: 32.35 KG/M2 | HEART RATE: 78 BPM | RESPIRATION RATE: 16 BRPM

## 2019-10-21 DIAGNOSIS — F33.1 MDD (MAJOR DEPRESSIVE DISORDER), RECURRENT EPISODE, MODERATE (HCC): Primary | ICD-10-CM

## 2019-10-21 DIAGNOSIS — B36.9 FUNGAL INFECTION OF SKIN: ICD-10-CM

## 2019-10-21 RX ORDER — NYSTATIN 100000 U/G
OINTMENT TOPICAL 2 TIMES DAILY
Qty: 30 G | Refills: 0 | Status: SHIPPED | OUTPATIENT
Start: 2019-10-21 | End: 2020-04-21 | Stop reason: SDUPTHER

## 2019-10-21 RX ORDER — PAROXETINE HYDROCHLORIDE 20 MG/1
20 TABLET, FILM COATED ORAL DAILY
Qty: 90 TAB | Refills: 0 | Status: SHIPPED | OUTPATIENT
Start: 2019-10-21 | End: 2020-01-12

## 2019-10-21 RX ORDER — PAROXETINE HYDROCHLORIDE 20 MG/1
20 TABLET, FILM COATED ORAL DAILY
Qty: 30 TAB | Refills: 1 | Status: SHIPPED | OUTPATIENT
Start: 2019-10-21 | End: 2019-10-21 | Stop reason: SDUPTHER

## 2019-10-21 NOTE — PROGRESS NOTES
Chief Complaint   Patient presents with    Medication Evaluation     1. Have you been to the ER, urgent care clinic since your last visit? Hospitalized since your last visit? No    2. Have you seen or consulted any other health care providers outside of the [address] [place] [address] since your last visit? Include any pap smears or colon screening.  Yes Dr. Roberta Davis 10/2019 DX: Neck Pain

## 2019-11-01 RX ORDER — SIMVASTATIN 20 MG/1
TABLET, FILM COATED ORAL
Qty: 90 TAB | Refills: 0 | Status: SHIPPED | OUTPATIENT
Start: 2019-11-01 | End: 2020-01-31

## 2019-12-10 DIAGNOSIS — F33.1 MODERATE EPISODE OF RECURRENT MAJOR DEPRESSIVE DISORDER (HCC): ICD-10-CM

## 2019-12-10 RX ORDER — FLUOXETINE HYDROCHLORIDE 20 MG/1
CAPSULE ORAL
Qty: 90 CAP | Refills: 0 | Status: SHIPPED | OUTPATIENT
Start: 2019-12-10 | End: 2020-03-19 | Stop reason: SDUPTHER

## 2020-01-12 DIAGNOSIS — F33.1 MDD (MAJOR DEPRESSIVE DISORDER), RECURRENT EPISODE, MODERATE (HCC): ICD-10-CM

## 2020-01-12 RX ORDER — PAROXETINE HYDROCHLORIDE 20 MG/1
TABLET, FILM COATED ORAL
Qty: 90 TAB | Refills: 0 | Status: SHIPPED | OUTPATIENT
Start: 2020-01-12 | End: 2020-06-08 | Stop reason: ALTCHOICE

## 2020-01-31 RX ORDER — SIMVASTATIN 20 MG/1
TABLET, FILM COATED ORAL
Qty: 90 TAB | Refills: 0 | Status: SHIPPED | OUTPATIENT
Start: 2020-01-31 | End: 2020-04-26 | Stop reason: SDUPTHER

## 2020-02-03 ENCOUNTER — OFFICE VISIT (OUTPATIENT)
Dept: FAMILY MEDICINE CLINIC | Age: 60
End: 2020-02-03

## 2020-02-03 VITALS
DIASTOLIC BLOOD PRESSURE: 88 MMHG | RESPIRATION RATE: 18 BRPM | OXYGEN SATURATION: 96 % | SYSTOLIC BLOOD PRESSURE: 118 MMHG | BODY MASS INDEX: 33.36 KG/M2 | TEMPERATURE: 98.6 F | HEIGHT: 69 IN | WEIGHT: 225.2 LBS | HEART RATE: 69 BPM

## 2020-02-03 DIAGNOSIS — E78.5 HYPERLIPIDEMIA LDL GOAL <130: Primary | ICD-10-CM

## 2020-02-03 DIAGNOSIS — F33.1 MDD (MAJOR DEPRESSIVE DISORDER), RECURRENT EPISODE, MODERATE (HCC): ICD-10-CM

## 2020-02-03 DIAGNOSIS — M54.2 NECK PAIN: ICD-10-CM

## 2020-02-03 RX ORDER — MELOXICAM 15 MG/1
15 TABLET ORAL DAILY
Qty: 30 TAB | Refills: 0 | Status: SHIPPED | OUTPATIENT
Start: 2020-02-03 | End: 2020-04-12

## 2020-02-03 RX ORDER — MELOXICAM 15 MG/1
TABLET ORAL
COMMUNITY
Start: 2020-01-29 | End: 2020-02-03 | Stop reason: SDUPTHER

## 2020-02-03 RX ORDER — MOMETASONE FUROATE 1 MG/G
CREAM TOPICAL
COMMUNITY
Start: 2020-01-29 | End: 2020-04-07

## 2020-02-03 NOTE — PROGRESS NOTES
Chief Complaint   Patient presents with    Depression     f/u   1. Have you been to the ER, urgent care clinic since your last visit? Hospitalized since your last visit? No    2. Have you seen or consulted any other health care providers outside of the [address] [place] [address] since your last visit? Include any pap smears or colon screening.  No

## 2020-02-03 NOTE — PROGRESS NOTES
HISTORY OF PRESENT ILLNESS  Steph Botello is a 63 y.o. male. HPI: Following up on chronic problems, patient has history of hyperlipidemia, depression and neck pain. He reports that her needs another medication to help with his depression, he is followed by psychologist weekly. He is due for fating blood work on cholesterol taking medication as prescribed, no medication side effects reported . He saw orthopedics for neck pain was prescribe Mobic and physical therapy. He is taking Mobic by not going to PT due to high copayment . Requesting refill  Past Medical History:   Diagnosis Date    Acne     Allergic rhinitis, cause unspecified 5/13/2014    Contact dermatitis- ketoconazole 1/20/2015    Contact dermatitis- ketoconazole 1/20/2015    Depression     Eczema 1/20/2015    Hyperlipidemia LDL goal <130 10/10/2017    Itchy skin- entire body . x face-allergist and dermatologist treatments have not helped-started~2013 10/10/2017    FARZANA (obstructive sleep apnea) 2006    cpap 12cm    Other and unspecified hyperlipidemia     Sleep apnea 8/2/2011    Vitamin D deficiency 5/13/2014   No past surgical history on file. No Known Allergies  Current Outpatient Medications on File Prior to Visit   Medication Sig Dispense Refill    simvastatin (ZOCOR) 20 mg tablet TAKE 1 TABLET BY MOUTH EVERY DAY AT NIGHT 90 Tab 0    FLUoxetine (PROZAC) 20 mg capsule TAKE 1 CAPSULE BY MOUTH EVERY DAY 90 Cap 0    nystatin (MYCOSTATIN) 100,000 unit/gram ointment Apply  to affected area two (2) times a day. 30 g 0    loratadine (CLARITIN) 10 mg tablet Take 10 mg by mouth.  beta carotene 25,000 unit capsule Take 25,000 Units by mouth daily.  vitamin E (AQUA GEMS) 400 unit capsule Take  by mouth daily.  Cholecalciferol, Vitamin D3, (VITAMIN D3) 1,000 unit cap Take 2,000 Int'l Units/L by mouth.       mometasone (ELOCON) 0.1 % topical cream       [DISCONTINUED] meloxicam (MOBIC) 15 mg tablet       PARoxetine (PAXIL) 20 mg tablet TAKE 1 TABLET BY MOUTH EVERY DAY 90 Tab 0    triamcinolone acetonide (KENALOG) 0.5 % ointment Apply  to affected area two (2) times a day. use thin layer 30 g 0    triamcinolone acetonide (KENALOG) 0.1 % topical cream Apply  to affected area two (2) times a day. use thin layer 60 g 1     No current facility-administered medications on file prior to visit. Review of Systems   Constitutional: Negative. HENT: Negative. Respiratory: Negative. Cardiovascular: Negative. Gastrointestinal: Negative. Psychiatric/Behavioral: Positive for depression. Negative for hallucinations, memory loss, substance abuse and suicidal ideas. The patient is not nervous/anxious and does not have insomnia. Blood pressure 118/88, pulse 69, temperature 98.6 °F (37 °C), temperature source Oral, resp. rate 18, height 5' 9\" (1.753 m), weight 225 lb 3.2 oz (102.2 kg), SpO2 96 %. Body mass index is 33.26 kg/m². Physical Exam  Constitutional:       Appearance: Normal appearance. He is obese. HENT:      Mouth/Throat:      Mouth: Mucous membranes are moist.      Pharynx: Oropharynx is clear. Neck:      Musculoskeletal: Normal range of motion and neck supple. Cardiovascular:      Rate and Rhythm: Normal rate and regular rhythm. Pulses: Normal pulses. Heart sounds: Normal heart sounds. No murmur. Pulmonary:      Effort: Pulmonary effort is normal. No respiratory distress. Breath sounds: Normal breath sounds. Abdominal:      General: Bowel sounds are normal.      Palpations: Abdomen is soft. Neurological:      Mental Status: He is alert. Psychiatric:         Mood and Affect: Mood normal.         Thought Content: Thought content normal.         ASSESSMENT and PLAN    ICD-10-CM ICD-9-CM    1. Hyperlipidemia LDL goal <130 E78.5 272.4 LIPID PANEL      METABOLIC PANEL, COMPREHENSIVE   2.  MDD (major depressive disorder), recurrent episode, moderate (HCC) F33.1 296.32 vortioxetine (TRINTELLIX) 5 mg tablet   3.  Neck pain M54.2 723.1 meloxicam (MOBIC) 15 mg tablet   Follow up in 4 months for general medical exam  Pt was given an after visit summary which includes diagnosis, current medicines and vital and voiced understanding of treatment plan

## 2020-02-04 LAB
ALBUMIN SERPL-MCNC: 4.4 G/DL (ref 3.8–4.9)
ALBUMIN/GLOB SERPL: 1.9 {RATIO} (ref 1.2–2.2)
ALP SERPL-CCNC: 55 IU/L (ref 39–117)
ALT SERPL-CCNC: 26 IU/L (ref 0–44)
AST SERPL-CCNC: 19 IU/L (ref 0–40)
BILIRUB SERPL-MCNC: 0.2 MG/DL (ref 0–1.2)
BUN SERPL-MCNC: 17 MG/DL (ref 6–24)
BUN/CREAT SERPL: 15 (ref 9–20)
CALCIUM SERPL-MCNC: 9.1 MG/DL (ref 8.7–10.2)
CHLORIDE SERPL-SCNC: 103 MMOL/L (ref 96–106)
CHOLEST SERPL-MCNC: 170 MG/DL (ref 100–199)
CO2 SERPL-SCNC: 24 MMOL/L (ref 20–29)
CREAT SERPL-MCNC: 1.17 MG/DL (ref 0.76–1.27)
GLOBULIN SER CALC-MCNC: 2.3 G/DL (ref 1.5–4.5)
GLUCOSE SERPL-MCNC: 98 MG/DL (ref 65–99)
HDLC SERPL-MCNC: 46 MG/DL
INTERPRETATION, 910389: NORMAL
LDLC SERPL CALC-MCNC: 108 MG/DL (ref 0–99)
POTASSIUM SERPL-SCNC: 4.7 MMOL/L (ref 3.5–5.2)
PROT SERPL-MCNC: 6.7 G/DL (ref 6–8.5)
SODIUM SERPL-SCNC: 142 MMOL/L (ref 134–144)
TRIGL SERPL-MCNC: 81 MG/DL (ref 0–149)
VLDLC SERPL CALC-MCNC: 16 MG/DL (ref 5–40)

## 2020-02-06 DIAGNOSIS — F33.1 MDD (MAJOR DEPRESSIVE DISORDER), RECURRENT EPISODE, MODERATE (HCC): ICD-10-CM

## 2020-02-06 RX ORDER — SERTRALINE HYDROCHLORIDE 50 MG/1
50 TABLET, FILM COATED ORAL DAILY
Qty: 90 TAB | Refills: 3 | Status: SHIPPED | OUTPATIENT
Start: 2020-02-06 | End: 2020-06-08 | Stop reason: ALTCHOICE

## 2020-02-06 NOTE — TELEPHONE ENCOUNTER
PCP: Erin Grove MD    Last appt: 2/3/2020  Future Appointments   Date Time Provider Gabriel Cyn   6/8/2020  9:00 AM Ishaan Green  [address]       Requested Prescriptions      No prescriptions requested or ordered in this encounter

## 2020-02-06 NOTE — TELEPHONE ENCOUNTER
Pharm on file verified. They are requesting a refill on the following meds. Sertraline HCL 50 mg tablet.

## 2020-03-19 DIAGNOSIS — F33.1 MODERATE EPISODE OF RECURRENT MAJOR DEPRESSIVE DISORDER (HCC): ICD-10-CM

## 2020-03-20 RX ORDER — FLUOXETINE HYDROCHLORIDE 20 MG/1
20 CAPSULE ORAL DAILY
Qty: 30 CAP | Refills: 0 | Status: SHIPPED | OUTPATIENT
Start: 2020-03-20 | End: 2020-04-13 | Stop reason: SDUPTHER

## 2020-04-07 DIAGNOSIS — L29.9 ITCHY SKIN: ICD-10-CM

## 2020-04-07 RX ORDER — TRIAMCINOLONE ACETONIDE 1 MG/G
CREAM TOPICAL 2 TIMES DAILY
Qty: 60 G | Refills: 3 | Status: SHIPPED | OUTPATIENT
Start: 2020-04-07 | End: 2021-06-08 | Stop reason: SDUPTHER

## 2020-04-07 RX ORDER — MOMETASONE FUROATE 1 MG/G
CREAM TOPICAL
Qty: 45 G | Refills: 3 | Status: SHIPPED | OUTPATIENT
Start: 2020-04-07 | End: 2021-06-08 | Stop reason: SDUPTHER

## 2020-04-07 NOTE — TELEPHONE ENCOUNTER
PCP: Shiloh Olivia MD    Last appt: 2/3/2020  Future Appointments   Date Time Provider Gabriel Addison   6/8/2020  9:00 AM Heide Parry NP [facility]       Requested Prescriptions     Pending Prescriptions Disp Refills    mometasone (ELOCON) 0.1 % topical cream [Pharmacy Med Name: MOMETASONE FUROATE 0.1% CREAM] 45 g 3     Sig: APPLY TO AFFECTED AREA EVERY DAY    triamcinolone acetonide (KENALOG) 0.1 % topical cream [Pharmacy Med Name: TRIAMCINOLONE 0.1% CREAM] 60 g 1     Sig: APPLY TO AFFECTED AREA TWO (2) TIMES A DAY.  USE THIN LAYER

## 2020-04-12 DIAGNOSIS — M54.2 NECK PAIN: ICD-10-CM

## 2020-04-12 RX ORDER — MELOXICAM 15 MG/1
TABLET ORAL
Qty: 30 TAB | Refills: 0 | Status: SHIPPED | OUTPATIENT
Start: 2020-04-12 | End: 2020-05-12

## 2020-04-13 DIAGNOSIS — F33.1 MODERATE EPISODE OF RECURRENT MAJOR DEPRESSIVE DISORDER (HCC): ICD-10-CM

## 2020-04-13 NOTE — TELEPHONE ENCOUNTER
Patient request for 90 d/s    Last Visit: 2/3/20  BERENICE Cash  Next Appointment: 6/8/20  BERENICE Cash  Previous Refill Encounter(s): 3/20/20  #30    Requested Prescriptions     Pending Prescriptions Disp Refills    FLUoxetine (PROzac) 20 mg capsule 90 Cap 0     Sig: Take 1 Cap by mouth daily.

## 2020-04-14 RX ORDER — FLUOXETINE HYDROCHLORIDE 20 MG/1
20 CAPSULE ORAL DAILY
Qty: 90 CAP | Refills: 0 | Status: SHIPPED | OUTPATIENT
Start: 2020-04-14 | End: 2020-07-04

## 2020-04-21 DIAGNOSIS — B36.9 FUNGAL INFECTION OF SKIN: ICD-10-CM

## 2020-04-21 RX ORDER — NYSTATIN 100000 U/G
OINTMENT TOPICAL 2 TIMES DAILY
Qty: 30 G | Refills: 2 | Status: SHIPPED | OUTPATIENT
Start: 2020-04-21 | End: 2020-10-26 | Stop reason: SDUPTHER

## 2020-04-26 RX ORDER — SIMVASTATIN 20 MG/1
TABLET, FILM COATED ORAL
Qty: 90 TAB | Refills: 0 | Status: SHIPPED | OUTPATIENT
Start: 2020-04-26 | End: 2020-07-22

## 2020-05-12 DIAGNOSIS — M54.2 NECK PAIN: ICD-10-CM

## 2020-05-12 RX ORDER — MELOXICAM 15 MG/1
TABLET ORAL
Qty: 30 TAB | Refills: 0 | Status: SHIPPED | OUTPATIENT
Start: 2020-05-12 | End: 2020-07-04

## 2020-06-08 ENCOUNTER — VIRTUAL VISIT (OUTPATIENT)
Dept: FAMILY MEDICINE CLINIC | Age: 60
End: 2020-06-08

## 2020-06-08 DIAGNOSIS — E78.5 HYPERLIPIDEMIA LDL GOAL <130: Primary | ICD-10-CM

## 2020-06-08 DIAGNOSIS — F33.1 MODERATE EPISODE OF RECURRENT MAJOR DEPRESSIVE DISORDER (HCC): ICD-10-CM

## 2020-06-08 NOTE — PROGRESS NOTES
Chief Complaint   Patient presents with    Cholesterol Problem     follow up     Medication Evaluation     cholesterol:  patient requesting to go back to 40 mg notice change in levels when decrease in dosage. Also, review for Trintellex     1. Have you been to the ER, urgent care clinic since your last visit? Hospitalized since your last visit? No    2. Have you seen or consulted any other health care providers outside of the [address] [place] [address] since your last visit? Include any pap smears or colon screening.  No

## 2020-06-08 NOTE — PROGRESS NOTES
HISTORY OF PRESENT ILLNESS  Ghanshyam Oconnor is a 63 y.o. male. Virtual follow up office visit. Consent: Ghanshyam Oconnor, who was seen by synchronous (real-time) audio-video technology, and/or his healthcare decision maker, is aware that this patient-initiated, Telehealth encounter on 6/8/2020 is a billable service, with coverage as determined by his insurance carrier. He is aware that he may receive a bill and has provided verbal consent to proceed: Yes. Ghanshyam Oconnor is a 63 y.o. male who was evaluated by an audio-video encounter for concerns as above. Patient identification was verified prior to start of the visit. A caregiver was present when appropriate. Due to this being a TeleHealth encounter ([facility]), evaluation of the following organ systems was limited: Vitals/Constitutional/EENT/Resp/CV/GI//MS/Neuro/Skin/Heme-Lymph-Imm. Pursuant to the emergency declaration under the [facility] [place] [address] waiver authority and the [employer] Act, this Virtual Visit was conducted, with patient's (and/or legal guardian's) consent, to reduce the patient's risk of exposure to COVID-19 and provide necessary medical care. Services were provided through a synchronous discussion virtually to substitute for in-person clinic visit. I was in the office. The patient was at home. HPI  Follow up health problems. Hypercholesterolemia. Taking simvastatin as prescribed. Last FLP at goal, done 4 months ago. Following healthy diet, exercising regularly. Depression. Symptoms recurring. Taking prozac with only mild symptom control. Has been referred for [facility] therapy. On hold due to insurance coverage. Was prescribed trintellix and rexulti but cost was too much. Has paperwork to complete to reduce cost of meds.   Reports he has tried multiple SSRI and cymbalta in the past.  Stopped due to inefficacy. Currently sees a therapist weekly. Past Medical History:   Diagnosis Date    Acne     Allergic rhinitis, cause unspecified 5/13/2014    Chronic neck pain     Contact dermatitis- ketoconazole 1/20/2015    Contact dermatitis- ketoconazole 1/20/2015    Depression     Eczema 1/20/2015    Hyperlipidemia LDL goal <130 10/10/2017    Itchy skin- entire body . x face-allergist and dermatologist treatments have not helped-started~2013 10/10/2017    FARZANA (obstructive sleep apnea) 2006    cpap 12cm    Other and unspecified hyperlipidemia     Sleep apnea 8/2/2011    Vitamin D deficiency 5/13/2014     Patient Active Problem List   Diagnosis Code    Acne L70.9    Sleep apnea G47.30    Depression- ++ Family Hx F32.9    Vitamin D deficiency E55.9    Allergic rhinitis J30.9    Eczema L30.9    Contact dermatitis- ketoconazole L25.9    Itching- skin, scalp L29.9    Itchy skin- entire body . x face-allergist and dermatologist treatments have not helped-started~2013 L29.9    Hyperlipidemia LDL goal <130 E78.5    Severe obesity with body mass index (BMI) of 35.0 to 39.9 with serious comorbidity (HCC) E66.01     Current Outpatient Medications   Medication Sig    meloxicam (MOBIC) 15 mg tablet TAKE 1 TABLET BY MOUTH EVERY DAY    simvastatin (ZOCOR) 20 mg tablet TAKE 1 TABLET BY MOUTH EVERY DAY EVERY NIGHT    nystatin (MYCOSTATIN) 100,000 unit/gram ointment Apply  to affected area two (2) times a day.  FLUoxetine (PROzac) 20 mg capsule Take 1 Cap by mouth daily.  mometasone (ELOCON) 0.1 % topical cream APPLY TO AFFECTED AREA EVERY DAY    triamcinolone acetonide (KENALOG) 0.1 % topical cream APPLY TO AFFECTED AREA TWO (2) TIMES A DAY. USE THIN LAYER    loratadine (CLARITIN) 10 mg tablet Take 10 mg by mouth.  beta carotene 25,000 unit capsule Take 25,000 Units by mouth daily.  vitamin E (AQUA GEMS) 400 unit capsule Take  by mouth daily.     Cholecalciferol, Vitamin D3, (VITAMIN D3) 1,000 unit cap Take 2,000 Int'l Units/L by mouth. No current facility-administered medications for this visit. Social History     Tobacco Use    Smoking status: Former Smoker     Last attempt to quit: 7/15/1987     Years since quittin.9    Smokeless tobacco: Never Used   Substance Use Topics    Alcohol use: Yes     Alcohol/week: 4.2 standard drinks     Types: 5 Cans of beer per week    Drug use: No       Review of Systems   Constitutional: Negative for weight loss. Respiratory: Negative for cough and shortness of breath. Cardiovascular: Negative for chest pain, palpitations and leg swelling. Psychiatric/Behavioral: Positive for depression. Negative for hallucinations, substance abuse and suicidal ideas. The patient is not nervous/anxious and does not have insomnia. All other systems reviewed and are negative. Physical Exam  Constitutional:       General: He is not in acute distress. Pulmonary:      Effort: Pulmonary effort is normal. No respiratory distress. Neurological:      Mental Status: He is alert. Psychiatric:         Mood and Affect: Mood normal.         Behavior: Behavior normal.         Thought Content: Thought content normal.         ASSESSMENT and PLAN  Diagnoses and all orders for this visit:    1. Hyperlipidemia LDL goal <130  Controlled based on last FLP. Reviewed healthy diet and exercise recommendations. 2. Moderate episode of recurrent major depressive disorder ([facility] [address]  Moderately stable on prozac. He will forward forms for reduced co pay for trintellix and rexulti. Continue therapy. Follow up 3 months or sooner prn. We discussed the expected course, resolution and complications of the diagnosis in detail. Medication risks, benefits, costs, interactions and side effects were discussed. The patient was advised to contact the office for worsening of condition or FTI as anticipated. The patient expressed understanding of the diagnosis and plan.     Greater than 50% of 30 minute visit spent counseling regarding various treatment options for depression, form review, and coordinating care.

## 2020-06-29 ENCOUNTER — PATIENT MESSAGE (OUTPATIENT)
Dept: FAMILY MEDICINE CLINIC | Age: 60
End: 2020-06-29

## 2020-07-02 DIAGNOSIS — M54.2 NECK PAIN: ICD-10-CM

## 2020-07-02 DIAGNOSIS — F33.1 MODERATE EPISODE OF RECURRENT MAJOR DEPRESSIVE DISORDER (HCC): ICD-10-CM

## 2020-07-02 NOTE — TELEPHONE ENCOUNTER
Chief Complaint   Patient presents with    Form Completion     Form completed for tiering exception form. Form completed by provider Bianka Mays NP and faxed to [phone] with confirmation received. Patient informed via [email or web address].   Gregory Linton LPN

## 2020-07-04 RX ORDER — MELOXICAM 15 MG/1
TABLET ORAL
Qty: 30 TAB | Refills: 0 | Status: SHIPPED | OUTPATIENT
Start: 2020-07-04 | End: 2021-06-08 | Stop reason: ALTCHOICE

## 2020-07-04 RX ORDER — FLUOXETINE HYDROCHLORIDE 20 MG/1
CAPSULE ORAL
Qty: 90 CAP | Refills: 0 | Status: SHIPPED | OUTPATIENT
Start: 2020-07-04 | End: 2020-07-19 | Stop reason: ALTCHOICE

## 2020-07-15 ENCOUNTER — TELEPHONE (OUTPATIENT)
Dept: FAMILY MEDICINE CLINIC | Age: 60
End: 2020-07-15

## 2020-07-15 NOTE — TELEPHONE ENCOUNTER
Pt requetsing a 90 day supply for Trintellix 5 mgs    He states the insurance approved it for 90 day supply   Pharmacy verified    LOV :  June 08, 2020 09:30 AM

## 2020-07-17 NOTE — TELEPHONE ENCOUNTER
Patient is calling stating that he is waiting for the medication for 90 day supply.   His insurance wont pay for 30 days  Requesting [facility] to refill it   Trintellix 5 mgs      [facility]# [phone]  Pharmacy verified

## 2020-07-19 DIAGNOSIS — F33.9 RECURRENT DEPRESSION (HCC): Primary | ICD-10-CM

## 2020-07-22 RX ORDER — SIMVASTATIN 20 MG/1
TABLET, FILM COATED ORAL
Qty: 90 TAB | Refills: 0 | Status: SHIPPED | OUTPATIENT
Start: 2020-07-22 | End: 2020-10-13 | Stop reason: SDUPTHER

## 2020-07-23 NOTE — TELEPHONE ENCOUNTER
Prescription sent for 90 day supply to pharmacy. Patient informed via [employer].   Almas Parisi LPN

## 2020-09-18 LAB — SARS-COV-2, NAA: NEGATIVE

## 2020-10-13 RX ORDER — SIMVASTATIN 20 MG/1
20 TABLET, FILM COATED ORAL
Qty: 90 TAB | Refills: 0 | Status: SHIPPED | OUTPATIENT
Start: 2020-10-13 | End: 2020-10-26 | Stop reason: SDUPTHER

## 2020-10-13 NOTE — TELEPHONE ENCOUNTER
Last visit 06/08/2020 Virtual visit BERENICE Banuelos   Next appointment 3 months (09/2020) - Nothing scheduled   Previous refill encounter(s)   07/22/2020 Zocor #90     Requested Prescriptions     Pending Prescriptions Disp Refills    simvastatin (ZOCOR) 20 mg tablet 90 Tab 1     Sig: Take 1 Tab by mouth nightly.

## 2020-11-02 ENCOUNTER — OFFICE VISIT (OUTPATIENT)
Dept: FAMILY MEDICINE CLINIC | Age: 60
End: 2020-11-02
Payer: COMMERCIAL

## 2020-11-02 ENCOUNTER — TELEPHONE (OUTPATIENT)
Dept: FAMILY MEDICINE CLINIC | Age: 60
End: 2020-11-02

## 2020-11-02 ENCOUNTER — PATIENT MESSAGE (OUTPATIENT)
Dept: FAMILY MEDICINE CLINIC | Age: 60
End: 2020-11-02

## 2020-11-02 VITALS
DIASTOLIC BLOOD PRESSURE: 75 MMHG | HEART RATE: 75 BPM | TEMPERATURE: 98.8 F | HEIGHT: 69 IN | OXYGEN SATURATION: 98 % | WEIGHT: 236.8 LBS | SYSTOLIC BLOOD PRESSURE: 120 MMHG | BODY MASS INDEX: 35.07 KG/M2 | RESPIRATION RATE: 18 BRPM

## 2020-11-02 DIAGNOSIS — E78.5 HYPERLIPIDEMIA LDL GOAL <130: ICD-10-CM

## 2020-11-02 DIAGNOSIS — F33.1 MODERATE EPISODE OF RECURRENT MAJOR DEPRESSIVE DISORDER (HCC): Primary | ICD-10-CM

## 2020-11-02 PROCEDURE — 99000 SPECIMEN HANDLING OFFICE-LAB: CPT | Performed by: NURSE PRACTITIONER

## 2020-11-02 PROCEDURE — 99213 OFFICE O/P EST LOW 20 MIN: CPT | Performed by: NURSE PRACTITIONER

## 2020-11-02 PROCEDURE — 36415 COLL VENOUS BLD VENIPUNCTURE: CPT | Performed by: NURSE PRACTITIONER

## 2020-11-02 RX ORDER — FLUOXETINE HYDROCHLORIDE 20 MG/1
20 CAPSULE ORAL DAILY
Qty: 90 CAP | Refills: 0 | Status: SHIPPED | OUTPATIENT
Start: 2020-11-02 | End: 2021-01-25

## 2020-11-02 NOTE — PROGRESS NOTES
HISTORY OF PRESENT ILLNESS  Pari Adam is a 63 y.o. male. HPI  Follow up MDD. Symptoms recurring. Has been referred for [address] therapy. On hold due to insurance coverage. Must try and fail both trintellix and rexulti prior to acceptance for [facility]. Was previously on prozac with sub optimal symptom control. Had recent trial of trintellix. Reports no symptom improvement on med. Has now resumed prozac. Would like to try rexulti at this time. Currently sees therapist weekly. Follow up hypercholesterolemia. Taking prescribed simvastatin. Admits to not consistently following a healthy diet and does not exercise on a regular basis. Patient Active Problem List   Diagnosis Code    Acne L70.9    Sleep apnea G47.30    Depression- ++ Family Hx F32.9    Vitamin D deficiency E55.9    Allergic rhinitis J30.9    Eczema L30.9    Contact dermatitis- ketoconazole L25.9    Itching- skin, scalp L29.9    Itchy skin- entire body . x face-allergist and dermatologist treatments have not helped-started~2013 L29.9    Hyperlipidemia LDL goal <130 E78.5    Severe obesity with body mass index (BMI) of 35.0 to 39.9 with serious comorbidity (HCC) E66.01     Current Outpatient Medications   Medication Sig    brexpiprazole (REXULTI) 0.5 mg tab tablet Take 1 Tab by mouth daily.  FLUoxetine (PROzac) 20 mg capsule Take 1 Cap by mouth daily.  simvastatin (ZOCOR) 20 mg tablet Take 1 Tab by mouth nightly.  meloxicam (MOBIC) 15 mg tablet TAKE 1 TABLET BY MOUTH EVERY DAY    nystatin (MYCOSTATIN) 100,000 unit/gram ointment Apply  to affected area two (2) times a day.  mometasone (ELOCON) 0.1 % topical cream APPLY TO AFFECTED AREA EVERY DAY    triamcinolone acetonide (KENALOG) 0.1 % topical cream APPLY TO AFFECTED AREA TWO (2) TIMES A DAY. USE THIN LAYER    loratadine (CLARITIN) 10 mg tablet Take 10 mg by mouth.  beta carotene 25,000 unit capsule Take 25,000 Units by mouth daily.     Cholecalciferol, Vitamin D3, (VITAMIN D3) 1,000 unit cap Take 2,000 Int'l Units/L by mouth.  vitamin E (AQUA GEMS) 400 unit capsule Take  by mouth daily. No current facility-administered medications for this visit. Social History     Tobacco Use    Smoking status: Former Smoker     Last attempt to quit: 7/15/1987     Years since quittin.3    Smokeless tobacco: Never Used   Substance Use Topics    Alcohol use: Yes     Alcohol/week: 4.2 standard drinks     Types: 5 Cans of beer per week    Drug use: No     Blood pressure 120/75, pulse 75, temperature 98.8 °F (37.1 °C), resp. rate 18, height 5' 9\" (1.753 m), weight 236 lb 12.8 oz (107.4 kg), SpO2 98 %. Review of Systems   Respiratory: Negative for cough and shortness of breath. Cardiovascular: Negative for chest pain, palpitations and leg swelling. Psychiatric/Behavioral: Positive for depression. Negative for hallucinations, substance abuse and suicidal ideas. The patient is not nervous/anxious and does not have insomnia. All other systems reviewed and are negative. Physical Exam  Constitutional:       General: He is not in acute distress. Comments: Overweight. Neck:      Musculoskeletal: Neck supple. Cardiovascular:      Rate and Rhythm: Normal rate and regular rhythm. Heart sounds: Normal heart sounds. Pulmonary:      Effort: Pulmonary effort is normal.      Breath sounds: Normal breath sounds. Lymphadenopathy:      Cervical: No cervical adenopathy. Neurological:      Mental Status: He is alert. Psychiatric:         Mood and Affect: Mood normal.         Behavior: Behavior normal.         Thought Content: Thought content normal.         ASSESSMENT and PLAN  Diagnoses and all orders for this visit:    1. Moderate episode of recurrent major depressive disorder (HCC)  -     brexpiprazole (REXULTI) 0.5 mg tab tablet; Take 1 Tab by mouth daily.  -     FLUoxetine (PROzac) 20 mg capsule; Take 1 Cap by mouth daily. Sub optimal control. Discontinue trintellix. Medications as above. Continue therapy. 2. Hyperlipidemia LDL goal <766  -     METABOLIC PANEL, COMPREHENSIVE; Future  -     CA HANDLG&/OR CONVEY OF SPEC FOR TR OFFICE TO LAB  -     COLLECTION VENOUS BLOOD,VENIPUNCTURE  -     LIPID PANEL; Future  Reviewed healthy diet and exercise recommendations. He will return for fasting labs. Follow up 2 months or sooner prn. We discussed the expected course, resolution and complications of the diagnosis in detail. Medication risks, benefits, costs, interactions and side effects were discussed. The patient was advised to contact the office for worsening of condition or FTI as anticipated. The patient expressed understanding of the diagnosis and plan.

## 2020-11-02 NOTE — PROGRESS NOTES
Chief Complaint   Patient presents with    Medication Management     1. Have you been to the ER, urgent care clinic since your last visit? Hospitalized since your last visit? No    2. Have you seen or consulted any other health care providers outside of the [address] [place] [address] since your last visit? Include any pap smears or colon screening. No    3 most recent PHQ Screens 11/2/2020   Little interest or pleasure in doing things Not at all   Feeling down, depressed, irritable, or hopeless Not at all   Total Score PHQ 2 0       Skin cancer removed on right deltoid in October 2020.

## 2020-11-02 NOTE — TELEPHONE ENCOUNTER
Chief Complaint   Patient presents with    Prior Auth     TIER EXCEPTION REQUEST:  REXULTI 0.5 MG TABLET     Form completed by Sandi William NP and faxed to [facility] at [phone] with confirmation received.   Franklin Menard LPN

## 2020-11-09 ENCOUNTER — APPOINTMENT (OUTPATIENT)
Dept: FAMILY MEDICINE CLINIC | Age: 60
End: 2020-11-09

## 2020-11-09 DIAGNOSIS — E78.5 HYPERLIPIDEMIA LDL GOAL <130: ICD-10-CM

## 2020-11-09 LAB
ALBUMIN SERPL-MCNC: 4.1 G/DL (ref 3.5–5)
ALBUMIN/GLOB SERPL: 1.2 {RATIO} (ref 1.1–2.2)
ALP SERPL-CCNC: 60 U/L (ref 45–117)
ALT SERPL-CCNC: 35 U/L (ref 12–78)
ANION GAP SERPL CALC-SCNC: 8 MMOL/L (ref 5–15)
AST SERPL-CCNC: 19 U/L (ref 15–37)
BILIRUB SERPL-MCNC: 0.5 MG/DL (ref 0.2–1)
BUN SERPL-MCNC: 18 MG/DL (ref 6–20)
BUN/CREAT SERPL: 17 (ref 12–20)
CALCIUM SERPL-MCNC: 8.8 MG/DL (ref 8.5–10.1)
CHLORIDE SERPL-SCNC: 106 MMOL/L (ref 97–108)
CHOLEST SERPL-MCNC: 187 MG/DL
CO2 SERPL-SCNC: 26 MMOL/L (ref 21–32)
CREAT SERPL-MCNC: 1.07 MG/DL (ref 0.7–1.3)
GLOBULIN SER CALC-MCNC: 3.3 G/DL (ref 2–4)
GLUCOSE SERPL-MCNC: 98 MG/DL (ref 65–100)
HDLC SERPL-MCNC: 42 MG/DL
HDLC SERPL: 4.5 {RATIO} (ref 0–5)
LDLC SERPL CALC-MCNC: 123.6 MG/DL (ref 0–100)
LIPID PROFILE,FLP: ABNORMAL
POTASSIUM SERPL-SCNC: 4.4 MMOL/L (ref 3.5–5.1)
PROT SERPL-MCNC: 7.4 G/DL (ref 6.4–8.2)
SODIUM SERPL-SCNC: 140 MMOL/L (ref 136–145)
TRIGL SERPL-MCNC: 107 MG/DL (ref ?–150)
VLDLC SERPL CALC-MCNC: 21.4 MG/DL

## 2020-11-19 NOTE — TELEPHONE ENCOUNTER
Chief Complaint   Patient presents with    Prior Auth     REXULTI 0.5 MG TABLET :  APPROVAL 11/03/2020 THROUGH 11/03/2023     Approval notification faxed to [facility] at [phone] with confirmation received. Gisselle Harman LPN    Patient informed via [employer]   Gisselle Harman LPN

## 2021-01-25 DIAGNOSIS — F33.1 MODERATE EPISODE OF RECURRENT MAJOR DEPRESSIVE DISORDER (HCC): ICD-10-CM

## 2021-01-25 RX ORDER — FLUOXETINE HYDROCHLORIDE 20 MG/1
CAPSULE ORAL
Qty: 90 CAP | Refills: 0 | Status: SHIPPED | OUTPATIENT
Start: 2021-01-25 | End: 2021-04-20 | Stop reason: SDUPTHER

## 2021-02-16 DIAGNOSIS — B36.9 FUNGAL INFECTION OF SKIN: ICD-10-CM

## 2021-02-16 RX ORDER — NYSTATIN 100000 U/G
OINTMENT TOPICAL
Qty: 30 G | Refills: 2 | Status: SHIPPED | OUTPATIENT
Start: 2021-02-16

## 2021-02-16 NOTE — TELEPHONE ENCOUNTER
Requested Prescriptions     Pending Prescriptions Disp Refills    simvastatin (ZOCOR) 20 mg tablet 90 Tab 0     Sig: Take 1 Tab by mouth nightly.

## 2021-02-17 RX ORDER — SIMVASTATIN 20 MG/1
20 TABLET, FILM COATED ORAL
Qty: 90 TAB | Refills: 0 | Status: SHIPPED | OUTPATIENT
Start: 2021-02-17 | End: 2021-08-31

## 2021-04-20 DIAGNOSIS — F33.1 MODERATE EPISODE OF RECURRENT MAJOR DEPRESSIVE DISORDER (HCC): ICD-10-CM

## 2021-04-20 NOTE — TELEPHONE ENCOUNTER
Needs to schedule an  office visit every 6 months- due in May. Once visit is scheduled we can refill medication until appointment.

## 2021-04-20 NOTE — TELEPHONE ENCOUNTER
Last Visit: 11/2/20 NP Deandra Brown  Next Appointment: Not scheduled  Previous Refill Encounter(s): 1/25/21 90    Requested Prescriptions     Pending Prescriptions Disp Refills    FLUoxetine (PROzac) 20 mg capsule 90 Cap 0     Sig: Take 1 Cap by mouth daily.

## 2021-04-21 RX ORDER — FLUOXETINE HYDROCHLORIDE 20 MG/1
20 CAPSULE ORAL DAILY
Qty: 90 CAP | Refills: 0 | Status: SHIPPED | OUTPATIENT
Start: 2021-04-21 | End: 2021-06-08 | Stop reason: SDUPTHER

## 2021-06-08 ENCOUNTER — OFFICE VISIT (OUTPATIENT)
Dept: FAMILY MEDICINE CLINIC | Age: 61
End: 2021-06-08
Payer: COMMERCIAL

## 2021-06-08 VITALS
WEIGHT: 238.2 LBS | TEMPERATURE: 98.6 F | OXYGEN SATURATION: 97 % | DIASTOLIC BLOOD PRESSURE: 94 MMHG | BODY MASS INDEX: 35.28 KG/M2 | RESPIRATION RATE: 16 BRPM | SYSTOLIC BLOOD PRESSURE: 138 MMHG | HEART RATE: 70 BPM | HEIGHT: 69 IN

## 2021-06-08 DIAGNOSIS — I10 ESSENTIAL HYPERTENSION: ICD-10-CM

## 2021-06-08 DIAGNOSIS — R05.3 CHRONIC COUGH: ICD-10-CM

## 2021-06-08 DIAGNOSIS — F33.1 MODERATE EPISODE OF RECURRENT MAJOR DEPRESSIVE DISORDER (HCC): ICD-10-CM

## 2021-06-08 DIAGNOSIS — Z00.00 ROUTINE GENERAL MEDICAL EXAMINATION AT A HEALTH CARE FACILITY: Primary | ICD-10-CM

## 2021-06-08 DIAGNOSIS — E66.01 SEVERE OBESITY WITH BODY MASS INDEX (BMI) OF 35.0 TO 39.9 WITH SERIOUS COMORBIDITY (HCC): ICD-10-CM

## 2021-06-08 DIAGNOSIS — L20.9 ATOPIC DERMATITIS, UNSPECIFIED TYPE: ICD-10-CM

## 2021-06-08 PROCEDURE — 99396 PREV VISIT EST AGE 40-64: CPT | Performed by: NURSE PRACTITIONER

## 2021-06-08 RX ORDER — FLUOXETINE HYDROCHLORIDE 20 MG/1
20 CAPSULE ORAL DAILY
Qty: 90 CAPSULE | Refills: 3 | Status: SHIPPED | OUTPATIENT
Start: 2021-06-08 | End: 2022-07-20 | Stop reason: SDUPTHER

## 2021-06-08 RX ORDER — CHOLECALCIFEROL (VITAMIN D3) 50 MCG
1 CAPSULE ORAL DAILY
COMMUNITY

## 2021-06-08 RX ORDER — MOMETASONE FUROATE 1 MG/G
CREAM TOPICAL
Qty: 45 G | Refills: 3 | Status: SHIPPED | OUTPATIENT
Start: 2021-06-08 | End: 2022-10-28 | Stop reason: SDUPTHER

## 2021-06-08 RX ORDER — TRIAMCINOLONE ACETONIDE 1 MG/G
CREAM TOPICAL 2 TIMES DAILY
Qty: 60 G | Refills: 3 | Status: SHIPPED | OUTPATIENT
Start: 2021-06-08

## 2021-06-08 NOTE — PATIENT INSTRUCTIONS
Starting a Weight Loss Plan: Care Instructions  Overview     If you're thinking about losing weight, it can be hard to know where to start. Your doctor can help you set up a weight loss plan that best meets your needs. You may want to take a class on nutrition or exercise, or you could join a weight loss support group. If you have questions about how to make changes to your eating or exercise habits, ask your doctor about seeing a registered dietitian or an exercise specialist.  It can be a big challenge to lose weight. But you don't have to make huge changes at once. Make small changes, and stick with them. When those changes become habit, add a few more changes. If you don't think you're ready to make changes right now, try to pick a date in the future. Make an appointment to see your doctor to discuss whether the time is right for you to start a plan. Follow-up care is a key part of your treatment and safety. Be sure to make and go to all appointments, and call your doctor if you are having problems. It's also a good idea to know your test results and keep a list of the medicines you take. How can you care for yourself at home? · Set realistic goals. Many people expect to lose much more weight than is likely. A weight loss of 5% to 10% of your body weight may be enough to improve your health. · Get family and friends involved to provide support. Talk to them about why you are trying to lose weight, and ask them to help. They can help by participating in exercise and having meals with you, even if they may be eating something different. · Find what works best for you. If you do not have time or do not like to cook, a program that offers meal replacement bars or shakes may be better for you. Or if you like to prepare meals, finding a plan that includes daily menus and recipes may be best.  · Ask your doctor about other health professionals who can help you achieve your weight loss goals.   ? A dietitian can help you make healthy changes in your diet. ? An exercise specialist or  can help you develop a safe and effective exercise program.  ? A counselor or psychiatrist can help you cope with issues such as depression, anxiety, or family problems that can make it hard to focus on weight loss. · Consider joining a support group for people who are trying to lose weight. Your doctor can suggest groups in your area. Where can you learn more? Go to http://www.gray.com/  Enter U357 in the search box to learn more about \"Starting a Weight Loss Plan: Care Instructions. \"  Current as of: September 23, 2020               Content Version: 12.8  © 3385-8413 Healthwise, [employer]. Care instructions adapted under license by [employer] (which disclaims liability or warranty for this information). If you have questions about a medical condition or this instruction, always ask your healthcare professional. [address] any warranty or liability for your use of this information.

## 2021-06-08 NOTE — PROGRESS NOTES
[facility] Note    Joana Perdue is a 62 y.o. male who was seen in clinic today (6/9/2021). Subjective:  Cardiovascular Review:  The patient has hypertension, hyperlipidemia and obesity. Diet and Lifestyle: generally follows a low fat low cholesterol diet, generally follows a low sodium diet, exercises sporadically, nonsmoker  Home BP Monitoring: is borderline controlled at home, ranging 140's/90's. Pertinent ROS: taking medications as instructed, no medication side effects noted, no TIA's, no chest pain on exertion, no dyspnea on exertion, no swelling of ankles. Patient has a medical marijuana card for chronic neck pain. Patient taking fluoxetine with control of MDD symptoms. Prior to Admission medications    Medication Sig Start Date End Date Taking? Authorizing Provider   B.infantis-B.ani-B.long-B.bifi (Probiotic 4X) 10-15 mg TbEC Take 1 Caplet by mouth daily. Yes Provider, Historical   OTHER    Yes Provider, Historical   FLUoxetine (PROzac) 20 mg capsule Take 1 Capsule by mouth daily. 6/8/21  Yes Kayley Estrada NP   mometasone (ELOCON) 0.1 % topical cream APPLY TO AFFECTED AREA EVERY DAY 6/8/21  Yes Kayley Estrada NP   triamcinolone acetonide (KENALOG) 0.1 % topical cream Apply  to affected area two (2) times a day. use thin layer 6/8/21  Yes Kayley Estrada NP   simvastatin (ZOCOR) 20 mg tablet Take 1 Tab by mouth nightly. 2/17/21  Yes Raphael Banuelos NP   nystatin (MYCOSTATIN) 100,000 unit/gram ointment APPLY TO AFFECTED AREA TWICE A DAY 2/16/21  Yes Les Armstrong MD   loratadine (CLARITIN) 10 mg tablet Take 10 mg by mouth. Yes Provider, Historical   beta carotene 25,000 unit capsule Take 25,000 Units by mouth daily. Yes Provider, Historical   vitamin E (AQUA GEMS) 400 unit capsule Take  by mouth daily. Yes Provider, Historical   Cholecalciferol, Vitamin D3, (VITAMIN D3) 1,000 unit cap Take 2,000 Int'l Units/L by mouth.    Yes Provider, Historical          No Known Allergies        Review of Systems   Constitutional: Negative for malaise/fatigue and weight loss. Respiratory: Negative for shortness of breath. Cardiovascular: Negative for chest pain, palpitations and leg swelling. Gastrointestinal: Negative for heartburn. Musculoskeletal: Negative for back pain, joint pain and myalgias. Neurological: Negative for dizziness, weakness and headaches. Psychiatric/Behavioral: Negative for depression. Objective:   Physical Exam  Vitals and nursing note reviewed. Constitutional:       General: He is not in acute distress. Appearance: He is well-developed. HENT:      Right Ear: Tympanic membrane and ear canal normal.      Left Ear: Tympanic membrane and ear canal normal.      Nose: No mucosal edema. Right Sinus: No maxillary sinus tenderness or frontal sinus tenderness. Left Sinus: No maxillary sinus tenderness or frontal sinus tenderness. Eyes:      Pupils: Pupils are equal, round, and reactive to light. Neck:      Thyroid: No thyromegaly. Vascular: No carotid bruit or JVD. Cardiovascular:      Rate and Rhythm: Normal rate and regular rhythm. Heart sounds: Normal heart sounds. No murmur heard. No friction rub. No gallop. Pulmonary:      Effort: Pulmonary effort is normal. No respiratory distress. Breath sounds: Normal breath sounds. No decreased breath sounds, wheezing or rhonchi. Abdominal:      General: Bowel sounds are normal. There is no distension. Palpations: Abdomen is soft. Tenderness: There is no abdominal tenderness. Musculoskeletal:      Cervical back: Normal range of motion and neck supple. Lymphadenopathy:      Cervical: No cervical adenopathy. Neurological:      Mental Status: He is alert and oriented to person, place, and time.    Psychiatric:         Behavior: Behavior normal.           Visit Vitals  BP (!) 138/94 (BP 1 Location: Left upper arm, BP Patient Position: Sitting)   Pulse 70   Temp 98.6 °F (37 °C) (Temporal)   Resp 16   Ht 5' 9\" (1.753 m)   Wt 238 lb 3.2 oz (108 kg)   SpO2 97%   BMI 35.18 kg/m²       Assessment & Plan:  Diagnoses and all orders for this visit:    1. Routine general medical examination at a health care facility  Well adult, reviewed routine screenings as well as healthy diet and lifestyle practices    2. Moderate episode of recurrent major depressive disorder ([facility]  Well controlled, no medication changes.  -     FLUoxetine (PROzac) 20 mg capsule; Take 1 Capsule by mouth daily. 3. Essential hypertension  Reviewed low sodium diet and weight loss. Continue home monitoring and call for reading >140/90    4. Severe obesity with body mass index (BMI) of 35.0 to 39.9 with serious comorbidity [facility]  Discussed need for weight loss through diet and exercise. Reviewed decreased caloric intake and increased activity. 5. Chronic cough  Allergies versus GERD. Chest x-ray given smoking history  -     XR CHEST PA LAT; Future    6. Atopic dermatitis, unspecified type  -     triamcinolone acetonide (KENALOG) 0.1 % topical cream; Apply  to affected area two (2) times a day. use thin layer  -     mometasone (ELOCON) 0.1 % topical cream; APPLY TO AFFECTED AREA EVERY DAY      I have discussed the diagnosis with the patient and the intended plan as seen in the above orders. The patient has received an after-visit summary along with patient information handout. I have discussed medication side effects and warnings with the patient as well. Follow-up and Dispositions    · Return in about 6 months (around 12/8/2021) for blood pressure.            Jose Mays, NP

## 2021-06-08 NOTE — PROGRESS NOTES
Chief Complaint   Patient presents with    Complete Physical    Breathing Problem     with exertion      1. Have you been to the ER, urgent care clinic since your last visit? Hospitalized since your last visit? No    2. Have you seen or consulted any other health care providers outside of the [address] [place] [address] since your last visit? Include any pap smears or colon screening.  Yes When: dermatology

## 2021-12-06 NOTE — MR AVS SNAPSHOT
Visit Information Date & Time Provider Department Dept. Phone Encounter #  
 10/9/2017 12:15 PM Tate Pal MD [address] [place], [place] [phone] [id number] Upcoming Health Maintenance Date Due INFLUENZA AGE 9 TO ADULT 8/1/2017 DTaP/Tdap/Td series (2 - Td) 8/2/2021 COLONOSCOPY 12/11/2025 Allergies as of 10/9/2017  Review Complete On: 10/9/2017 By: Tanya Steele LPN No Known Allergies Current Immunizations  Never Reviewed Name Date TDAP Vaccine 8/2/2011 Not reviewed this visit You Were Diagnosed With   
  
 Codes Comments Hyperlipidemia LDL goal <130    -  Primary ICD-10-CM: E78.5 ICD-9-CM: 272.4 Vitals BP Pulse Temp Resp Height(growth percentile) Weight(growth percentile) 133/84 (BP 1 Location: Left arm, BP Patient Position: Sitting) 69 97.8 °F (36.6 °C) (Oral) 18 5' 9\" (1.753 m) 249 lb 9.6 oz (113.2 kg) SpO2 BMI Smoking Status 96% 36.86 kg/m2 Former Smoker Vitals History BMI and BSA Data Body Mass Index Body Surface Area  
 36.86 kg/m 2 2.35 m 2 Preferred Pharmacy Pharmacy Name Phone [id number] [address] AT [address] [place], [place] [phone] Your Updated Medication List  
  
   
This list is accurate as of: 10/9/17  2:07 PM.  Always use your most recent med list.  
  
  
  
  
 Alayne Genre EX  
by Apply Externally route. beta carotene 25,000 unit capsule Take 25,000 Units by mouth daily. CLARITIN 10 mg tablet Generic drug:  loratadine Take 10 mg by mouth. desonide 0.05 % cream  
Commonly known as:  TRIDESILON  
APPLY TO AFFECTED AREA 2 TIMES A DAY. FLUoxetine 20 mg capsule Commonly known as:  PROzac TAKE ONE CAPSULE BY MOUTH EVERY DAY  
  
 methylPREDNISolone 4 mg tablet Commonly known as:  [place] Tanya Use as directed.   
  
 mometasone 0.1 % topical cream  
Commonly known as:  Cristopher Salinas  
 APPLY TO AFFECTED AREA DAILY. montelukast 10 mg tablet Commonly known as:  SINGULAIR  
TAKE 1 TALBET BY MOUTH DAILY promethazine-codeine 6.25-10 mg/5 mL syrup Commonly known as:  PHENERGAN with CODEINE Take 5 mL by mouth four (4) times daily as needed for Cough. Max Daily Amount: 20 mL. Indications: ALLERGIC RHINITIS, COLD SYMPTOMS, COUGH  
  
 simvastatin 40 mg tablet Commonly known as:  ZOCOR  
TAKE 1 TABLET BY MOUTH NIGHTLY.  
  
 triamcinolone acetonide 0.1 % topical cream  
Commonly known as:  KENALOG Apply  to affected area two (2) times a day. use thin layer  
  
 vitamin a-vitamin c-vit e-min tablet Commonly known as:  Evelene Mini Take 1 Tab by mouth daily. VITAMIN D3 1,000 unit Cap Generic drug:  cholecalciferol Take 2,000 Int'l Units/L by mouth.  
  
 vitamin E 400 unit capsule Commonly known as:  [address] Take  by mouth daily. ZyrTEC 10 mg Cap Generic drug:  Cetirizine Take  by mouth. Prescriptions Sent to Pharmacy Refills  
 triamcinolone acetonide (KENALOG) 0.1 % topical cream 1 Sig: Apply  to affected area two (2) times a day. use thin layer Class: Normal  
 [facility] [place] [address]  #: [phone] Route: Topical  
  
We Performed the Following LIPID PANEL [68790 CPT(R)] METABOLIC PANEL, COMPREHENSIVE [33223 CPT(R)] Patient Instructions Candidiasis: Care Instructions Your Care Instructions Candidiasis (say \"ogh-svo-BN-uh-roby\") is a yeast infection. Yeast normally lives in your body. But it can cause problems if your body's defenses don't work as they should. Some medicines can increase your chance of getting a yeast infection. These include antibiotics, steroids, and cancer drugs. And some diseases like AIDS and diabetes can make you more likely to get yeast infections. There are different types of yeast infections. Blase Press is a yeast infection in the mouth. It usually occurs in people with weak immune systems. It causes white patches inside the mouth and throat. Yeast infections of the skin usually occur in skin folds where the skin stays moist. They cause red, oozing patches on your skin. Babies can get these infections under the diaper. People who often wear gloves can get them on their hands. Many women get vaginal yeast infections. They are most common when women take antibiotics. These infections can cause the vagina to itch and burn. They also cause white discharge that looks like cottage cheese. In rare cases, yeast infects the blood. This can cause serious disease. This kind of infection is treated with medicine given through a needle into a vein (IV). After you start treatment, a yeast infection usually goes away quickly. But if your immune system is weak, the infection may come back. Tell your doctor if you get yeast infections often. Follow-up care is a key part of your treatment and safety. Be sure to make and go to all appointments, and call your doctor if you are having problems. It's also a good idea to know your test results and keep a list of the medicines you take. How can you care for yourself at home? · Take your medicines exactly as prescribed. Call your doctor if you think you are having a problem with your medicine. · Use antibiotics only as directed by your doctor. · Eat yogurt with live cultures. It has bacteria called lactobacillus. It may help prevent some types of yeast infections. · Keep your skin clean and dry. Put powder on moist places. · If you are using a cream or suppository to treat a vaginal yeast infection, don't use condoms or a diaphragm. Use a different type of birth control. · Eat a healthy diet and get regular exercise. This will help keep your immune system strong. When should you call for help? Call your doctor now or seek immediate medical care if: · You have a fever. · You are pregnant and have signs of a vaginal or urinary tract infection such as: ¨ Severe itching in your vagina. ¨ Pain during sex or when you urinate. ¨ Unusual discharge from your vagina. ¨ A frequent urge to urinate. ¨ Urine that is cloudy or smells bad. Watch closely for changes in your health, and be sure to contact your doctor if: 
· You do not get better as expected. Where can you learn more? Go to http://ranjith-sho.info/. Enter J730 in the search box to learn more about \"Candidiasis: Care Instructions. \" Current as of: October 13, 2016 Content Version: 11.3 © 5326-7809 [employer]. Care instructions adapted under license by [employer] (which disclaims liability or warranty for this information). If you have questions about a medical condition or this instruction, always ask your healthcare professional. [facility] [address] any warranty or liability for your use of this information. Introducing hospitals & HEALTH SERVICES! Dear Roma Cranker: 
Thank you for requesting a [employer] account. Our records indicate that you already have an active GlycoPure account. You can access your account anytime at [email or web address] Did you know that you can access your hospital and ER discharge instructions at any time in GlycoPure? You can also review all of your test results from your hospital stay or ER visit. Additional Information If you have questions, please visit the Frequently Asked Questions section of the GlycoPure website at [email or web address] Remember, GlycoPure is NOT to be used for urgent needs. For medical emergencies, dial 911. Now available from your iPhone and Android! Please provide this summary of care documentation to your next provider. Your primary care clinician is listed as [place] [address], [facility]   If you have any questions after today's visit, please call [phone]. mammogram

## 2021-12-24 LAB — SARS-COV-2, NAA: POSITIVE

## 2022-02-09 RX ORDER — SIMVASTATIN 20 MG/1
20 TABLET, FILM COATED ORAL
Qty: 90 TABLET | Refills: 0 | Status: SHIPPED | OUTPATIENT
Start: 2022-02-09 | End: 2022-10-28 | Stop reason: ALTCHOICE

## 2022-02-09 NOTE — TELEPHONE ENCOUNTER
----- Message from Kira Millan sent at 2/9/2022 10:54 AM EST -----  Subject: Message to Provider    QUESTIONS  Information for Provider? Patient called in regards of a sooner   appointment possible . Patient is running short on medication . Patient   would like a monday or tuesday . Please reach out in this regards   ---------------------------------------------------------------------------  --------------  CALL BACK INFO  What is the best way for the office to contact you? OK to leave message on   voicemail  Preferred Call Back Phone Number? [phone]  ---------------------------------------------------------------------------  --------------  SCRIPT ANSWERS  Relationship to Patient?  Self

## 2022-02-21 ENCOUNTER — TELEPHONE (OUTPATIENT)
Dept: FAMILY MEDICINE CLINIC | Age: 62
End: 2022-02-21

## 2022-02-21 DIAGNOSIS — E78.5 HYPERLIPIDEMIA LDL GOAL <130: Primary | ICD-10-CM

## 2022-02-21 NOTE — TELEPHONE ENCOUNTER
----- Message from Bill Christensen sent at 2/21/2022 12:15 PM EST -----  Subject: Message to Provider    QUESTIONS  Information for Provider? Please call pt and clarify if he needs to   schedule a lab appt or if he can just walk in   ---------------------------------------------------------------------------  --------------  CALL BACK INFO  What is the best way for the office to contact you? OK to leave message on   voicemail  Preferred Call Back Phone Number?  [phone]  ---------------------------------------------------------------------------  --------------  SCRIPT ANSWERS  undefined

## 2022-02-28 ENCOUNTER — APPOINTMENT (OUTPATIENT)
Dept: FAMILY MEDICINE CLINIC | Age: 62
End: 2022-02-28

## 2022-02-28 DIAGNOSIS — E78.5 HYPERLIPIDEMIA LDL GOAL <130: ICD-10-CM

## 2022-02-28 LAB
ALBUMIN SERPL-MCNC: 4.1 G/DL (ref 3.5–5)
ALBUMIN/GLOB SERPL: 1.2 {RATIO} (ref 1.1–2.2)
ALP SERPL-CCNC: 69 U/L (ref 45–117)
ALT SERPL-CCNC: 38 U/L (ref 12–78)
ANION GAP SERPL CALC-SCNC: 3 MMOL/L (ref 5–15)
AST SERPL-CCNC: 24 U/L (ref 15–37)
BILIRUB SERPL-MCNC: 0.3 MG/DL (ref 0.2–1)
BUN SERPL-MCNC: 21 MG/DL (ref 6–20)
BUN/CREAT SERPL: 19 (ref 12–20)
CALCIUM SERPL-MCNC: 9.1 MG/DL (ref 8.5–10.1)
CHLORIDE SERPL-SCNC: 106 MMOL/L (ref 97–108)
CHOLEST SERPL-MCNC: 188 MG/DL
CO2 SERPL-SCNC: 28 MMOL/L (ref 21–32)
CREAT SERPL-MCNC: 1.12 MG/DL (ref 0.7–1.3)
GLOBULIN SER CALC-MCNC: 3.5 G/DL (ref 2–4)
GLUCOSE SERPL-MCNC: 108 MG/DL (ref 65–100)
HDLC SERPL-MCNC: 40 MG/DL
HDLC SERPL: 4.7 {RATIO} (ref 0–5)
LDLC SERPL CALC-MCNC: 122.4 MG/DL (ref 0–100)
POTASSIUM SERPL-SCNC: 4.6 MMOL/L (ref 3.5–5.1)
PROT SERPL-MCNC: 7.6 G/DL (ref 6.4–8.2)
SODIUM SERPL-SCNC: 137 MMOL/L (ref 136–145)
TRIGL SERPL-MCNC: 128 MG/DL (ref ?–150)
VLDLC SERPL CALC-MCNC: 25.6 MG/DL

## 2022-03-08 NOTE — PROGRESS NOTES
HISTORY OF PRESENT ILLNESS  Lupe Quintanilla is a 63 y.o. male. HPI: Following up on depression, started in Zoloft, reports it helps him but still feel depressed, will try another medication today. Currently he is followed by psychologist every week   Requesting refills on fungal cream .    Past Medical History:   Diagnosis Date    Acne     Allergic rhinitis, cause unspecified 5/13/2014    Contact dermatitis- ketoconazole 1/20/2015    Contact dermatitis- ketoconazole 1/20/2015    Depression     Eczema 1/20/2015    Hyperlipidemia LDL goal <130 10/10/2017    Itchy skin- entire body . x face-allergist and dermatologist treatments have not helped-started~2013 10/10/2017    FARZANA (obstructive sleep apnea) 2006    cpap 12cm    Other and unspecified hyperlipidemia     Sleep apnea 8/2/2011    Vitamin D deficiency 5/13/2014   History reviewed. No pertinent surgical history. No Known Allergies    Current Outpatient Medications:     nystatin (MYCOSTATIN) 100,000 unit/gram ointment, Apply  to affected area two (2) times a day., Disp: 30 g, Rfl: 0    PARoxetine (PAXIL) 20 mg tablet, Take 1 Tab by mouth daily. , Disp: 90 Tab, Rfl: 0    simvastatin (ZOCOR) 20 mg tablet, Take 1 Tab by mouth nightly., Disp: 90 Tab, Rfl: 0    triamcinolone acetonide (KENALOG) 0.1 % topical cream, Apply  to affected area two (2) times a day. use thin layer, Disp: 60 g, Rfl: 1    loratadine (CLARITIN) 10 mg tablet, Take 10 mg by mouth., Disp: , Rfl:     beta carotene 25,000 unit capsule, Take 25,000 Units by mouth daily. , Disp: , Rfl:     vitamin E (AQUA GEMS) 400 unit capsule, Take  by mouth daily. , Disp: , Rfl:     Cholecalciferol, Vitamin D3, (VITAMIN D3) 1,000 unit cap, Take 2,000 Int'l Units/L by mouth., Disp: , Rfl:     triamcinolone acetonide (KENALOG) 0.5 % ointment, Apply  to affected area two (2) times a day. use thin layer, Disp: 30 g, Rfl: 0  Review of Systems   Constitutional: Negative. Respiratory: Negative. Cardiovascular: Negative. Gastrointestinal: Negative. Psychiatric/Behavioral: Positive for depression. Negative for hallucinations, substance abuse and suicidal ideas. The patient is not nervous/anxious. Blood pressure 122/86, pulse 78, temperature 98.9 °F (37.2 °C), temperature source Oral, resp. rate 16, height 5' 9\" (1.753 m), weight 218 lb 6.4 oz (99.1 kg), SpO2 97 %. Physical Exam   Constitutional: No distress. HENT:   Mouth/Throat: Oropharynx is clear and moist.   Neck: Normal range of motion. Neck supple. Cardiovascular: Normal rate and regular rhythm. No murmur heard. Pulmonary/Chest: Effort normal and breath sounds normal.   Abdominal: Soft. Bowel sounds are normal.   Psychiatric: He has a normal mood and affect. His behavior is normal.   Nursing note and vitals reviewed. ASSESSMENT and PLAN  Diagnoses and all orders for this visit:    1. MDD (major depressive disorder), recurrent episode, moderate (HCC)  -     PARoxetine (PAXIL) 20 mg tablet; Take 1 Tab by mouth daily. 2. Fungal infection of skin  -     nystatin (MYCOSTATIN) 100,000 unit/gram ointment; Apply  to affected area two (2) times a day.   Follow up in 3 months, sooner if needed  Pt was given an after visit summary which includes diagnosis, current medicines and vital and voiced understanding of treatment plan No

## 2022-03-14 ENCOUNTER — VIRTUAL VISIT (OUTPATIENT)
Dept: FAMILY MEDICINE CLINIC | Age: 62
End: 2022-03-14
Payer: COMMERCIAL

## 2022-03-14 DIAGNOSIS — L20.9 ATOPIC DERMATITIS, UNSPECIFIED TYPE: ICD-10-CM

## 2022-03-14 DIAGNOSIS — L29.9 ITCHY SKIN: ICD-10-CM

## 2022-03-14 DIAGNOSIS — M54.2 NECK PAIN: ICD-10-CM

## 2022-03-14 DIAGNOSIS — E78.5 HYPERLIPIDEMIA LDL GOAL <130: Primary | ICD-10-CM

## 2022-03-14 DIAGNOSIS — I10 ESSENTIAL HYPERTENSION: ICD-10-CM

## 2022-03-14 DIAGNOSIS — E66.01 SEVERE OBESITY WITH BODY MASS INDEX (BMI) OF 35.0 TO 39.9 WITH SERIOUS COMORBIDITY (HCC): ICD-10-CM

## 2022-03-14 PROCEDURE — 99214 OFFICE O/P EST MOD 30 MIN: CPT | Performed by: FAMILY MEDICINE

## 2022-03-19 PROBLEM — L29.9 ITCHY SKIN: Status: ACTIVE | Noted: 2017-10-10

## 2022-03-19 PROBLEM — E78.5 HYPERLIPIDEMIA LDL GOAL <130: Status: ACTIVE | Noted: 2017-10-10

## 2022-03-20 PROBLEM — E66.01 SEVERE OBESITY WITH BODY MASS INDEX (BMI) OF 35.0 TO 39.9 WITH SERIOUS COMORBIDITY (HCC): Status: ACTIVE | Noted: 2018-08-27

## 2022-05-12 ENCOUNTER — PATIENT MESSAGE (OUTPATIENT)
Dept: FAMILY MEDICINE CLINIC | Age: 62
End: 2022-05-12

## 2022-05-12 RX ORDER — ROSUVASTATIN CALCIUM 10 MG/1
10 TABLET, COATED ORAL
Qty: 90 TABLET | Refills: 1 | Status: SHIPPED | OUTPATIENT
Start: 2022-05-12 | End: 2022-10-28 | Stop reason: SDUPTHER

## 2022-05-12 NOTE — TELEPHONE ENCOUNTER
----- Message from Ab Dumont MD sent at 5/12/2022 11:48 AM EDT -----  Regarding: FW: Simvistatin replacement Rx  Change to rosuvastatin 10 mg daily. #90, 1R  ----- Message -----  From: Jono Brooks LPN  Sent: 9/35/1999  11:21 AM EDT  To: Ab Dumont MD  Subject: [facility]: Simvistatin replacement Rx                     ----- Message -----  From: Yee Diamond  Sent: 5/12/2022  11:10 AM EDT  To: [facility] Nurse Pool  Subject: Simvistatin replacement Rx                       Dr. Jaja Cordova,   We discussed in my last visit the switch of cholesterol drug away from [facility]. I'm almost finished current supply so Simv. Please forward a new Rx to my Pharmacy on record for one of the alternative drugs for high cholesterol.   Thanks

## 2022-07-20 DIAGNOSIS — F33.1 MODERATE EPISODE OF RECURRENT MAJOR DEPRESSIVE DISORDER (HCC): ICD-10-CM

## 2022-07-20 NOTE — TELEPHONE ENCOUNTER
Last Visit: VV 3/14/22 MD Seth Foley, labs 2/2022  Next Appointment: None  Previous Refill Encounter(s): 6/8/21 90 + 3    Requested Prescriptions     Pending Prescriptions Disp Refills    FLUoxetine (PROzac) 20 mg capsule 90 Capsule 3     Sig: Take 1 Capsule by mouth in the morning. For [address] in place:   Recommendation Provided To:    Intervention Detail: New Rx: 1, reason: Patient Preference  Gap Closed?:   Intervention Accepted By:   Time Spent (min): 5

## 2022-07-21 RX ORDER — FLUOXETINE HYDROCHLORIDE 20 MG/1
20 CAPSULE ORAL DAILY
Qty: 90 CAPSULE | Refills: 1 | Status: SHIPPED | OUTPATIENT
Start: 2022-07-21

## 2022-10-26 ENCOUNTER — TELEPHONE (OUTPATIENT)
Dept: FAMILY MEDICINE CLINIC | Age: 62
End: 2022-10-26

## 2022-10-26 NOTE — TELEPHONE ENCOUNTER
----- Message from Mathew Sanchez sent at 10/26/2022  1:22 PM EDT -----  Subject: Appointment Request    Reason for Call: Established Patient Appointment needed: Routine Existing   Condition Follow Up    QUESTIONS    Reason for appointment request? Available appointments did not meet   patient need     Additional Information for Provider?  Pt phnd needs fup appt/med check   before 10/31 (pt trfring to new job & only has ins til end of this mth) no   appts available; screened green  ---------------------------------------------------------------------------  --------------  Ann Marie Espana QJPW  [phone]; OK to leave message on voicemail  ---------------------------------------------------------------------------  --------------  SCRIPT ANSWERS  COVID Screen: Nohemy Carreno

## 2022-10-28 ENCOUNTER — OFFICE VISIT (OUTPATIENT)
Dept: FAMILY MEDICINE CLINIC | Age: 62
End: 2022-10-28
Payer: COMMERCIAL

## 2022-10-28 VITALS
OXYGEN SATURATION: 97 % | DIASTOLIC BLOOD PRESSURE: 70 MMHG | HEIGHT: 69 IN | TEMPERATURE: 97.5 F | RESPIRATION RATE: 17 BRPM | WEIGHT: 225 LBS | HEART RATE: 83 BPM | BODY MASS INDEX: 33.33 KG/M2 | SYSTOLIC BLOOD PRESSURE: 138 MMHG

## 2022-10-28 DIAGNOSIS — J06.9 URI, ACUTE: ICD-10-CM

## 2022-10-28 DIAGNOSIS — L29.9 ITCHY SKIN: ICD-10-CM

## 2022-10-28 DIAGNOSIS — E78.5 HYPERLIPIDEMIA LDL GOAL <130: Primary | ICD-10-CM

## 2022-10-28 DIAGNOSIS — Z23 NEEDS FLU SHOT: ICD-10-CM

## 2022-10-28 DIAGNOSIS — F33.1 MODERATE EPISODE OF RECURRENT MAJOR DEPRESSIVE DISORDER (HCC): ICD-10-CM

## 2022-10-28 DIAGNOSIS — I10 ESSENTIAL HYPERTENSION: ICD-10-CM

## 2022-10-28 DIAGNOSIS — E66.9 OBESITY (BMI 30.0-34.9): ICD-10-CM

## 2022-10-28 DIAGNOSIS — E55.9 VITAMIN D DEFICIENCY: ICD-10-CM

## 2022-10-28 PROCEDURE — 90471 IMMUNIZATION ADMIN: CPT | Performed by: FAMILY MEDICINE

## 2022-10-28 PROCEDURE — 99214 OFFICE O/P EST MOD 30 MIN: CPT | Performed by: FAMILY MEDICINE

## 2022-10-28 PROCEDURE — 3078F DIAST BP <80 MM HG: CPT | Performed by: FAMILY MEDICINE

## 2022-10-28 PROCEDURE — 3074F SYST BP LT 130 MM HG: CPT | Performed by: FAMILY MEDICINE

## 2022-10-28 PROCEDURE — 90686 IIV4 VACC NO PRSV 0.5 ML IM: CPT | Performed by: FAMILY MEDICINE

## 2022-10-28 RX ORDER — ROSUVASTATIN CALCIUM 20 MG/1
20 TABLET, COATED ORAL
Qty: 90 TABLET | Refills: 2 | Status: SHIPPED | OUTPATIENT
Start: 2022-10-28

## 2022-10-28 RX ORDER — ROSUVASTATIN CALCIUM 10 MG/1
10 TABLET, COATED ORAL
Qty: 90 TABLET | Refills: 1 | Status: SHIPPED | OUTPATIENT
Start: 2022-10-28 | End: 2022-10-28 | Stop reason: DRUGHIGH

## 2022-10-28 RX ORDER — VITAMIN E 268 MG
400 CAPSULE ORAL
COMMUNITY

## 2022-10-28 RX ORDER — MOMETASONE FUROATE 1 MG/G
CREAM TOPICAL
Qty: 45 G | Refills: 3 | Status: SHIPPED | OUTPATIENT
Start: 2022-10-28

## 2022-10-28 RX ORDER — AMOXICILLIN 500 MG/1
500 CAPSULE ORAL 3 TIMES DAILY
Qty: 30 CAPSULE | Refills: 0 | Status: SHIPPED | OUTPATIENT
Start: 2022-10-28 | End: 2022-11-07

## 2022-10-28 NOTE — PROGRESS NOTES
Chief Complaint   Patient presents with    Hypertension    Other     Patient complaining of an upper respiratory infection has been going on for a week. 1. \"Have you been to the ER, urgent care clinic since your last visit? Hospitalized since your last visit? \" No    2. \"Have you seen or consulted any other health care providers outside of the [address] [place] [address] since your last visit? \" No     3. For patients aged 39-70: Has the patient had a colonoscopy / FIT/ Cologuard? No      If the patient is female:    4. For patients aged 41-77: Has the patient had a mammogram within the past 2 years? NA - based on age or sex      11. For patients aged 21-65: Has the patient had a pap smear?  NA - based on age or sex         3 most recent PHQ Screens 10/28/2022   Little interest or pleasure in doing things Not at all   Feeling down, depressed, irritable, or hopeless Not at all   Total Score PHQ 2 0   Trouble falling or staying asleep, or sleeping too much Not at all   Feeling tired or having little energy Not at all   Poor appetite, weight loss, or overeating Not at all   Feeling bad about yourself - or that you are a failure or have let yourself or your family down Not at all   Trouble concentrating on things such as school, work, reading, or watching TV Not at all   Moving or speaking so slowly that other people could have noticed; or the opposite being so fidgety that others notice Not at all   Thoughts of being better off dead, or hurting yourself in some way Not at all   PHQ 9 Score 0   How difficult have these problems made it for you to do your work, take care of your home and get along with others Not difficult at all       Health Maintenance Due   Topic Date Due    Depression Monitoring  Never done    Shingrix Vaccine Age 50> (1 of 2) Never done    DTaP/Tdap/Td series (2 - Td or Tdap) 08/02/2021    COVID-19 Vaccine (4 - Booster for [employer] series) 01/31/2022    Flu Vaccine (1) 08/01/2022      Smita Dee Ab Marinelli is a 62 y.o. male  who presents for routine immunizations. Influenza Vaccine. She denies any symptoms , reactions or allergies that would exclude them from being immunized today. Risks and adverse reactions were discussed and the VIS was given to them. All questions were addressed. She was observed for 10 min post injection. There were no reactions observed.

## 2022-10-28 NOTE — PROGRESS NOTES
HISTORY OF PRESENT ILLNESS  HPI  Daria Meier is a 62 y.o. male with a history of eczema, contact dermatitis, sleep apnea, depression, vitamin D deficiency and hyperlipidemia with LDL goal <130, who present to the office today c/o cough and for follow up of these health problems. Pt believes he has a upper respiratory infection. This has been going on for a week ago. His symptoms initially presented with scratchy sore throat and then cough with yellow-green production. In the past few days, the cough has worsened. He says his throat still feels scratchy and feels that his right ear is clogged. Pt denies any F/C. He has done well on Bactrim and amoxicillin in the past. No antibiotic allergies      Pt denies unusual SOB, chest pain, and any recent ER visits or hospitalizations. Past Medical History:   Diagnosis Date    Acne     Allergic rhinitis, cause unspecified 5/13/2014    Chronic neck pain     Contact dermatitis- ketoconazole 1/20/2015    Contact dermatitis- ketoconazole 1/20/2015    Depression     Eczema 1/20/2015    Hyperlipidemia LDL goal <130 10/10/2017    Itchy skin- entire body . x face-allergist and dermatologist treatments have not helped-started~2013 10/10/2017    FARZANA (obstructive sleep apnea) 2006    cpap 12cm    Other and unspecified hyperlipidemia     Sleep apnea 8/2/2011    Vitamin D deficiency 5/13/2014     History reviewed. No pertinent surgical history. Current Outpatient Medications on File Prior to Visit   Medication Sig Dispense Refill    vitamin E (AQUA GEMS) 268 mg (400 unit) capsule 400 Int'l Units. FLUoxetine (PROzac) 20 mg capsule Take 1 Capsule by mouth in the morning. 90 Capsule 1    B.infantis-B.ani-B.long-B.bifi (Probiotic 4X) 10-15 mg TbEC Take 1 Caplet by mouth daily. triamcinolone acetonide (KENALOG) 0.1 % topical cream Apply  to affected area two (2) times a day.  use thin layer 60 g 3    nystatin (MYCOSTATIN) 100,000 unit/gram ointment APPLY TO AFFECTED AREA TWICE A DAY 30 g 2    loratadine (CLARITIN) 10 mg tablet Take 10 mg by mouth. beta carotene 25,000 unit capsule Take 25,000 Units by mouth daily. vitamin E (AQUA GEMS) 400 unit capsule Take  by mouth daily. cholecalciferol (VITAMIN D3) 25 mcg (1,000 unit) cap Take 2,000 Int'l Units/L by mouth. No current facility-administered medications on file prior to visit. No Known Allergies  Family History   Problem Relation Age of Onset    Diabetes Mother     Elevated Lipids Mother     Hypertension Mother     Diabetes Father     Heart Disease Father         MI in 66's    Stroke Father     Hypertension Father     Elevated Lipids Father     Heart Disease Brother         heart valve replacement     Social History     Socioeconomic History    Marital status:    Tobacco Use    Smoking status: Former     Types: Cigarettes     Quit date: 7/15/1987     Years since quittin.3    Smokeless tobacco: Never   Vaping Use    Vaping Use: Never used   Substance and Sexual Activity    Alcohol use:  Yes     Alcohol/week: 4.2 standard drinks     Types: 5 Cans of beer per week    Drug use: Yes     Types: Marijuana     Comment: medical use marijuana    Sexual activity: Yes     Partners: Female   Other Topics Concern     Service Yes    Blood Transfusions No    Caffeine Concern No    Occupational Exposure No    Hobby Hazards No    Sleep Concern No    Stress Concern No    Weight Concern Yes    Special Diet No    Back Care Yes     Comment: occasional visits to chiropractor    Exercise No    Seat Belt Yes    Self-Exams No     Social Determinants of Health     Financial Resource Strain: Low Risk     Difficulty of Paying Living Expenses: Not hard at all   Food Insecurity: No Food Insecurity    Worried About Running Out of Food in the Last Year: Never true    Ran Out of Food in the Last Year: Never true             Review of Systems   Constitutional:  Negative for chills, diaphoresis, fever, malaise/fatigue and weight loss. HENT:  Positive for congestion (ear congestion) and sore throat. Eyes:  Negative for blurred vision, double vision, pain and redness. Respiratory:  Positive for cough and sputum production. Negative for shortness of breath and wheezing. Cardiovascular:  Negative for chest pain, palpitations, orthopnea, claudication, leg swelling and PND. Skin:  Negative for itching and rash. Neurological:  Negative for dizziness, tingling, tremors, sensory change, speech change, focal weakness, seizures, loss of consciousness, weakness and headaches. Results for orders placed or performed in visit on 10/28/22   VITAMIN D, 25 HYDROXY   Result Value Ref Range    Vitamin D 25-Hydroxy 45.7 30 - 568 ng/mL   METABOLIC PANEL, COMPREHENSIVE   Result Value Ref Range    Sodium 138 136 - 145 mmol/L    Potassium 4.4 3.5 - 5.1 mmol/L    Chloride 102 97 - 108 mmol/L    CO2 30 21 - 32 mmol/L    Anion gap 6 5 - 15 mmol/L    Glucose 98 65 - 100 mg/dL    BUN 13 6 - 20 MG/DL    Creatinine 1.12 0.70 - 1.30 MG/DL    BUN/Creatinine ratio 12 12 - 20      eGFR >60 >60 ml/min/1.73m2    Calcium 9.1 8.5 - 10.1 MG/DL    Bilirubin, total 0.5 0.2 - 1.0 MG/DL    ALT (SGPT) 22 12 - 78 U/L    AST (SGOT) 15 15 - 37 U/L    Alk.  phosphatase 82 45 - 117 U/L    Protein, total 7.7 6.4 - 8.2 g/dL    Albumin 4.1 3.5 - 5.0 g/dL    Globulin 3.6 2.0 - 4.0 g/dL    A-G Ratio 1.1 1.1 - 2.2     LIPID PANEL   Result Value Ref Range    Cholesterol, total 146 <200 MG/DL    Triglyceride 129 <150 MG/DL    HDL Cholesterol 32 MG/DL    LDL, calculated 88.2 0 - 100 MG/DL    VLDL, calculated 25.8 MG/DL    CHOL/HDL Ratio 4.6 0.0 - 5.0                 Physical Exam  Visit Vitals  /70 (BP 1 Location: Right upper arm, BP Patient Position: Sitting, BP Cuff Size: Adult)   Pulse 83   Temp 97.5 °F (36.4 °C) (Temporal)   Resp 17   Ht 5' 9\" (1.753 m)   Wt 225 lb (102.1 kg)   SpO2 97%   BMI 33.23 kg/m²           Head: Normocephalic, without obvious abnormality, atraumatic  Eyes: conjunctivae/corneas clear. PERRL, EOM's intact. Neck: supple, symmetrical, trachea midline, no adenopathy, thyroid: not enlarged, symmetric, no tenderness/mass/nodules, no carotid bruit and no JVD  Lungs: clear to auscultation bilaterally. Lungs are clear except for rhonchi with coughing (pt did not cough the entire time he ws int he room until I had him take a deep breath and ten he had a wet sounding cough)  Heart: regular rate and rhythm, S1, S2 normal, no murmur, click, rub or gallop  Extremities: extremities normal, atraumatic, no cyanosis or edema  Pulses: 2+ and symmetric  Lymph nodes: Cervical, supraclavicular, and axillary nodes normal.  Neurologic: Grossly normal        ASSESSMENT and PLAN    ICD-10-CM ICD-9-CM    1. Hyperlipidemia LDL goal <130  E78.5 272.4 LIPID PANEL      METABOLIC PANEL, COMPREHENSIVE      METABOLIC PANEL, COMPREHENSIVE      LIPID PANEL      2. Essential hypertension  S14 333.7 METABOLIC PANEL, COMPREHENSIVE      METABOLIC PANEL, COMPREHENSIVE      3. Itchy skin  L29.9 698.9 mometasone (ELOCON) 0.1 % topical cream      4. Needs flu shot  Z23 V04.81 INFLUENZA, FLUARIX, FLULAVAL, FLUZONE (AGE 6 MO+), AFLURIA(AGE 3Y+) IM, PF, 0.5 ML      5. Moderate episode of recurrent major depressive disorder (HCC)  F33.1 296.32       6. URI, acute  J06.9 465.9     associated sinusitis        7. Vitamin D deficiency  E55.9 268.9 VITAMIN D, 25 HYDROXY      VITAMIN D, 25 HYDROXY      8. Obesity (BMI 30.0-34. 9)  E66.9 278.00         Diagnoses and all orders for this visit:    1. Hyperlipidemia LDL goal <130  -     LIPID PANEL; Future  -     METABOLIC PANEL, COMPREHENSIVE; Future    2. Essential hypertension  -     METABOLIC PANEL, COMPREHENSIVE; Future    3. Itchy skin  -     mometasone (ELOCON) 0.1 % topical cream; APPLY TO AFFECTED AREA EVERY DAY    4. Needs flu shot  -     INFLUENZA, FLUARIX, FLULAVAL, FLUZONE (AGE 6 MO+), AFLURIA(AGE 3Y+) IM, PF, 0.5 ML    5.  Moderate episode of recurrent major depressive disorder (HCC)    6. URI, acute  Comments:  associated sinusitis      7. Vitamin D deficiency  -     VITAMIN D, 25 HYDROXY; Future    8. Obesity (BMI 30.0-34.9)    Other orders  -     amoxicillin (AMOXIL) 500 mg capsule; Take 1 Capsule by mouth three (3) times daily for 10 days. -     rosuvastatin (CRESTOR) 20 mg tablet; Take 1 Tablet by mouth nightly. Follow-up and Dispositions    Return in about 6 months (around 4/28/2023) for F/U HTN and CHOL, allergic rhinitis, F/U of obesity. lab results and schedule of future lab studies reviewed with patient  reviewed diet, exercise and weight control  cardiovascular risk and specific lipid/LDL goals reviewed  reviewed medications and side effects in detail  Please call my office if there are any questions- [phone]. I will arrange for follow up after the lab tests done from today return  Recommended a weekly \"heart check. \" I went into detail how to do this. Call for refills on medications as needed. Discussed expected course/resolution/complications of diagnosis in detail with patient. Medication risks/benefits/costs/interactions/alternatives discussed with patient. Pt was given an after visit summary which includes diagnoses, current medications & vitals. Pt expressed understanding with the diagnosis and plan    Total 30 minutes  re: Reviewed symptoms, or lack thereof, of hypertension and elevated cholesterol. Regular exercise is very important to your health; it helps mentally, physically, socially; it prevents injuries if done properly. Exercise, even as simple as walking 20-30 minutes daily has major benefits to your health even though your \"numbers\" are the same in the lab. See if you can add this into your daily regimen and after a few months it will become a regular habit-\"just something you do,\" like brushing your teeth.      A combination of aerobic exercise and strengthening and stretching is felt to be the best for you, so this should be your ultimate goal.   This can be done in the privacy of your home or in a group setting as at the gym  Some prefer having a , others prefer to do exercise in groups or individually. Do what \"works\" for you. You need to make it simple and \"fun,\" or you most likely will not continue it. Recommended a weekly \"heart check. \" I went into detail how to do this. Also, discussed symptoms of concern that were noted today in the note above, treatment options( including doing nothing), when to follow up before recommended time frame. Also, answered all questions. I talked to pt about tetanus. He had a Tdap back in 2011 and is due regular tetanus. After he gets over this respiratory illness and has his COVID omicron boost, he should get his tetanus at some point in the near future. He does not check his BP regularly at home, but his wife is a nurse and can do that. I enc him start doing that sitting and standing once a month. We increased his rosuvastatin to 20 mg with intention to go to 40 mg as he did have a dad who has early CAD. This document was written by Serge Torres, as dictated by Ger Talbot MD.   I have reviewed and agree with the above note and have made corrections where appropriate José Manuel Bae M.D.

## 2022-10-29 LAB
25(OH)D3 SERPL-MCNC: 45.7 NG/ML (ref 30–100)
ALBUMIN SERPL-MCNC: 4.1 G/DL (ref 3.5–5)
ALBUMIN/GLOB SERPL: 1.1 {RATIO} (ref 1.1–2.2)
ALP SERPL-CCNC: 82 U/L (ref 45–117)
ALT SERPL-CCNC: 22 U/L (ref 12–78)
ANION GAP SERPL CALC-SCNC: 6 MMOL/L (ref 5–15)
AST SERPL-CCNC: 15 U/L (ref 15–37)
BILIRUB SERPL-MCNC: 0.5 MG/DL (ref 0.2–1)
BUN SERPL-MCNC: 13 MG/DL (ref 6–20)
BUN/CREAT SERPL: 12 (ref 12–20)
CALCIUM SERPL-MCNC: 9.1 MG/DL (ref 8.5–10.1)
CHLORIDE SERPL-SCNC: 102 MMOL/L (ref 97–108)
CHOLEST SERPL-MCNC: 146 MG/DL
CO2 SERPL-SCNC: 30 MMOL/L (ref 21–32)
CREAT SERPL-MCNC: 1.12 MG/DL (ref 0.7–1.3)
GLOBULIN SER CALC-MCNC: 3.6 G/DL (ref 2–4)
GLUCOSE SERPL-MCNC: 98 MG/DL (ref 65–100)
HDLC SERPL-MCNC: 32 MG/DL
HDLC SERPL: 4.6 {RATIO} (ref 0–5)
LDLC SERPL CALC-MCNC: 88.2 MG/DL (ref 0–100)
POTASSIUM SERPL-SCNC: 4.4 MMOL/L (ref 3.5–5.1)
PROT SERPL-MCNC: 7.7 G/DL (ref 6.4–8.2)
SODIUM SERPL-SCNC: 138 MMOL/L (ref 136–145)
TRIGL SERPL-MCNC: 129 MG/DL (ref ?–150)
VLDLC SERPL CALC-MCNC: 25.8 MG/DL

## 2023-01-19 DIAGNOSIS — F33.1 MODERATE EPISODE OF RECURRENT MAJOR DEPRESSIVE DISORDER (HCC): ICD-10-CM

## 2023-01-20 RX ORDER — FLUOXETINE HYDROCHLORIDE 20 MG/1
CAPSULE ORAL
Qty: 90 CAPSULE | Refills: 1 | Status: SHIPPED | OUTPATIENT
Start: 2023-01-20

## 2023-02-16 NOTE — TELEPHONE ENCOUNTER
Patient comment: On my visit 10/28/22 You advised should change cholesterol control drugs.  I am out of Rosuvastatin    Next appt 05/31/23

## 2023-02-17 RX ORDER — ROSUVASTATIN CALCIUM 40 MG/1
40 TABLET, COATED ORAL
Qty: 90 TABLET | Refills: 1 | Status: SHIPPED | OUTPATIENT
Start: 2023-02-17

## 2023-02-22 DIAGNOSIS — F33.1 MODERATE EPISODE OF RECURRENT MAJOR DEPRESSIVE DISORDER (HCC): ICD-10-CM

## 2023-02-22 RX ORDER — FLUOXETINE HYDROCHLORIDE 20 MG/1
20 CAPSULE ORAL DAILY
Qty: 90 CAPSULE | Refills: 1 | Status: SHIPPED | OUTPATIENT
Start: 2023-02-22

## 2023-08-21 RX ORDER — ROSUVASTATIN CALCIUM 40 MG/1
TABLET, COATED ORAL
Qty: 90 TABLET | Refills: 1 | Status: SHIPPED | OUTPATIENT
Start: 2023-08-21

## 2023-09-07 NOTE — TELEPHONE ENCOUNTER
Change in pharmacy to [facility]. Thanks, Chloé    Last appointment: 10/28/22 MD Inderjit Zhou, labs 10/2022  Next appointment: None- No show 5/31/23- Appt due  Previous refill encounter(s):   Fluoxetine 2/22/23 90 + 1  [facility]uvastatin 8/21/23 90 + 1 [facility]    Requested Prescriptions     Pending Prescriptions Disp Refills    FLUoxetine (PROZAC) 20 MG capsule 90 capsule 0     Sig: Take 1 capsule by mouth daily    rosuvastatin (CRESTOR) 40 MG tablet 90 tablet 0     Sig: Take 1 tablet by mouth nightly     For Pharmacy Admin Tracking Only    Program: Medication Refill  CPA in place:    Recommendation Provided To:    Intervention Detail: New Rx: 2, reason: Patient Preference  Intervention Accepted By:   Dot Thompson?:    Time Spent (min): 5

## 2023-09-10 RX ORDER — ROSUVASTATIN CALCIUM 40 MG/1
40 TABLET, COATED ORAL NIGHTLY
Qty: 90 TABLET | Refills: 0 | Status: SHIPPED | OUTPATIENT
Start: 2023-09-10

## 2023-09-10 RX ORDER — FLUOXETINE HYDROCHLORIDE 20 MG/1
20 CAPSULE ORAL DAILY
Qty: 90 CAPSULE | Refills: 0 | Status: SHIPPED | OUTPATIENT
Start: 2023-09-10

## 2023-09-16 DIAGNOSIS — F33.1 MAJOR DEPRESSIVE DISORDER, RECURRENT, MODERATE (HCC): ICD-10-CM

## 2023-09-19 RX ORDER — FLUOXETINE HYDROCHLORIDE 20 MG/1
CAPSULE ORAL DAILY
Qty: 90 CAPSULE | Refills: 1 | Status: SHIPPED | OUTPATIENT
Start: 2023-09-19

## 2023-09-19 NOTE — TELEPHONE ENCOUNTER
Left detailed message informing the pt that his medication's were approved. And to callback to make a HTN and CHOL, allergic rhinitis,obesity follow-up. Please schedule the pt accordingly-VBN

## 2024-01-19 ENCOUNTER — TELEPHONE (OUTPATIENT)
Age: 64
End: 2024-01-19

## 2024-01-19 NOTE — TELEPHONE ENCOUNTER
Left detailed message on 1/19/24 to inform the pt that we can schedule him for her Annual Physical,however  has nothing until July or August.

## 2024-01-19 NOTE — TELEPHONE ENCOUNTER
----- Message from Tye Wen sent at 1/18/2024 12:13 PM EST -----  Subject: Appointment Request    Reason for Call: Established Patient Appointment needed: Routine Medicare   AWV    QUESTIONS    Reason for appointment request? No appointments available during search     Additional Information for Provider? patient needs an annual physical   wants a call back to schedule nothing available.  ---------------------------------------------------------------------------  --------------  CALL BACK INFO  [phone]; OK to leave message on voicemail  ---------------------------------------------------------------------------  --------------  SCRIPT ANSWERS

## 2024-01-24 NOTE — TELEPHONE ENCOUNTER
Pt called back would like a call back to know if he can have his Physical with a different provider to be seen sooner than 07/2024 with his PCP. Best call back number [phone]

## 2024-01-30 NOTE — TELEPHONE ENCOUNTER
Left detailed message on 1/30/24 informing him that he can either wait until 's next available Annual Physical,or re-establish with a new PCP.

## 2024-04-19 DIAGNOSIS — F33.1 MAJOR DEPRESSIVE DISORDER, RECURRENT, MODERATE (HCC): ICD-10-CM

## 2024-04-19 RX ORDER — ROSUVASTATIN CALCIUM 40 MG/1
40 TABLET, COATED ORAL NIGHTLY
Qty: 90 TABLET | Refills: 1 | Status: SHIPPED | OUTPATIENT
Start: 2024-04-19

## 2024-04-19 RX ORDER — FLUOXETINE HYDROCHLORIDE 20 MG/1
CAPSULE ORAL DAILY
Qty: 90 CAPSULE | Refills: 1 | Status: SHIPPED | OUTPATIENT
Start: 2024-04-19

## 2024-06-26 ENCOUNTER — PATIENT MESSAGE (OUTPATIENT)
Age: 64
End: 2024-06-26

## 2024-07-02 NOTE — TELEPHONE ENCOUNTER
From: Guero Ceron  To: Dr. Guero Hernandez  Sent: 6/26/2024 8:50 PM EDT  Subject: Change Rx for cholesterol -- Increase dose for Fluoxetine    I would like to try a different Rx for control of high cholesterol. I am currently having chronic neck pain/spasms and headaches since about May 15 and suspect Rosuvastatin may be causing the issue. Also in speaking with my psychologist we would like to change Fluoxetine dose from 20mg to 40mg ASAP. I have an appointment for July 17 but I would greatly appreciate it if you could work me in sooner. The automated system would not allow me to set up an E-visit because it felt my answers to depression questionnare required immediate attention. Please call me ASAP. I cannot answer my phone at work but please leave a message and I will return the call promptly. I am off work and available to speak with you, daily, at 3.45pm.  Thanks  Guero Ceron  [phone]

## 2024-09-11 ENCOUNTER — OFFICE VISIT (OUTPATIENT)
Age: 64
End: 2024-09-11
Payer: COMMERCIAL

## 2024-09-11 VITALS
BODY MASS INDEX: 32.58 KG/M2 | WEIGHT: 220 LBS | RESPIRATION RATE: 16 BRPM | DIASTOLIC BLOOD PRESSURE: 72 MMHG | HEIGHT: 69 IN | TEMPERATURE: 97.2 F | HEART RATE: 80 BPM | SYSTOLIC BLOOD PRESSURE: 140 MMHG | OXYGEN SATURATION: 97 %

## 2024-09-11 DIAGNOSIS — M47.812 CERVICAL SPINE ARTHRITIS: ICD-10-CM

## 2024-09-11 DIAGNOSIS — E55.9 VITAMIN D DEFICIENCY: ICD-10-CM

## 2024-09-11 DIAGNOSIS — Z11.59 NEED FOR HEPATITIS C SCREENING TEST: ICD-10-CM

## 2024-09-11 DIAGNOSIS — G47.33 OBSTRUCTIVE SLEEP APNEA SYNDROME: ICD-10-CM

## 2024-09-11 DIAGNOSIS — L20.84 INTRINSIC ECZEMA: ICD-10-CM

## 2024-09-11 DIAGNOSIS — F33.1 MAJOR DEPRESSIVE DISORDER, RECURRENT, MODERATE (HCC): ICD-10-CM

## 2024-09-11 DIAGNOSIS — E78.5 DYSLIPIDEMIA, GOAL LDL BELOW 130: ICD-10-CM

## 2024-09-11 DIAGNOSIS — Z00.00 PHYSICAL EXAM: Primary | ICD-10-CM

## 2024-09-11 DIAGNOSIS — Z12.5 SCREENING PSA (PROSTATE SPECIFIC ANTIGEN): ICD-10-CM

## 2024-09-11 PROCEDURE — 99396 PREV VISIT EST AGE 40-64: CPT | Performed by: FAMILY MEDICINE

## 2024-09-11 RX ORDER — TRIAMCINOLONE ACETONIDE 1 MG/G
CREAM TOPICAL 2 TIMES DAILY
Qty: 30 G | Refills: 1 | Status: SHIPPED | OUTPATIENT
Start: 2024-09-11

## 2024-09-11 RX ORDER — ROSUVASTATIN CALCIUM 40 MG/1
40 TABLET, COATED ORAL NIGHTLY
Qty: 90 TABLET | Refills: 1 | Status: SHIPPED | OUTPATIENT
Start: 2024-09-11

## 2024-09-11 RX ORDER — NYSTATIN 100000 U/G
OINTMENT TOPICAL 2 TIMES DAILY
Qty: 30 G | Refills: 1 | Status: SHIPPED | OUTPATIENT
Start: 2024-09-11

## 2024-09-11 RX ORDER — CYCLOBENZAPRINE HCL 5 MG
5 TABLET ORAL 2 TIMES DAILY PRN
Qty: 30 TABLET | Refills: 1 | Status: SHIPPED | OUTPATIENT
Start: 2024-09-11 | End: 2024-10-11

## 2024-09-11 SDOH — ECONOMIC STABILITY: FOOD INSECURITY: WITHIN THE PAST 12 MONTHS, THE FOOD YOU BOUGHT JUST DIDN'T LAST AND YOU DIDN'T HAVE MONEY TO GET MORE.: NEVER TRUE

## 2024-09-11 SDOH — ECONOMIC STABILITY: FOOD INSECURITY: WITHIN THE PAST 12 MONTHS, YOU WORRIED THAT YOUR FOOD WOULD RUN OUT BEFORE YOU GOT MONEY TO BUY MORE.: NEVER TRUE

## 2024-09-11 SDOH — ECONOMIC STABILITY: INCOME INSECURITY: HOW HARD IS IT FOR YOU TO PAY FOR THE VERY BASICS LIKE FOOD, HOUSING, MEDICAL CARE, AND HEATING?: NOT HARD AT ALL

## 2024-09-11 ASSESSMENT — PATIENT HEALTH QUESTIONNAIRE - PHQ9
3. TROUBLE FALLING OR STAYING ASLEEP: NOT AT ALL
7. TROUBLE CONCENTRATING ON THINGS, SUCH AS READING THE NEWSPAPER OR WATCHING TELEVISION: NOT AT ALL
SUM OF ALL RESPONSES TO PHQ QUESTIONS 1-9: 0
SUM OF ALL RESPONSES TO PHQ QUESTIONS 1-9: 0
1. LITTLE INTEREST OR PLEASURE IN DOING THINGS: NOT AT ALL
5. POOR APPETITE OR OVEREATING: NOT AT ALL
8. MOVING OR SPEAKING SO SLOWLY THAT OTHER PEOPLE COULD HAVE NOTICED. OR THE OPPOSITE, BEING SO FIGETY OR RESTLESS THAT YOU HAVE BEEN MOVING AROUND A LOT MORE THAN USUAL: NOT AT ALL
10. IF YOU CHECKED OFF ANY PROBLEMS, HOW DIFFICULT HAVE THESE PROBLEMS MADE IT FOR YOU TO DO YOUR WORK, TAKE CARE OF THINGS AT HOME, OR GET ALONG WITH OTHER PEOPLE: NOT DIFFICULT AT ALL
9. THOUGHTS THAT YOU WOULD BE BETTER OFF DEAD, OR OF HURTING YOURSELF: NOT AT ALL
SUM OF ALL RESPONSES TO PHQ QUESTIONS 1-9: 0
4. FEELING TIRED OR HAVING LITTLE ENERGY: NOT AT ALL
SUM OF ALL RESPONSES TO PHQ QUESTIONS 1-9: 0
6. FEELING BAD ABOUT YOURSELF - OR THAT YOU ARE A FAILURE OR HAVE LET YOURSELF OR YOUR FAMILY DOWN: NOT AT ALL
SUM OF ALL RESPONSES TO PHQ9 QUESTIONS 1 & 2: 0
2. FEELING DOWN, DEPRESSED OR HOPELESS: NOT AT ALL

## 2024-09-11 ASSESSMENT — ENCOUNTER SYMPTOMS
CONSTIPATION: 0
WHEEZING: 0
SORE THROAT: 0
EYE REDNESS: 0
SHORTNESS OF BREATH: 0
DIARRHEA: 0
NAUSEA: 0
VOMITING: 0
ABDOMINAL PAIN: 0
COUGH: 0
EYE PAIN: 0
BACK PAIN: 0
BLOOD IN STOOL: 0
STRIDOR: 0
EYE DISCHARGE: 0

## 2024-09-12 LAB
25(OH)D3 SERPL-MCNC: 72.4 NG/ML (ref 30–100)
ALBUMIN SERPL-MCNC: 4.6 G/DL (ref 3.5–5)
ALBUMIN/GLOB SERPL: 1.4 (ref 1.1–2.2)
ALP SERPL-CCNC: 66 U/L (ref 45–117)
ALT SERPL-CCNC: 25 U/L (ref 12–78)
ANION GAP SERPL CALC-SCNC: 4 MMOL/L (ref 2–12)
APPEARANCE UR: CLEAR
AST SERPL-CCNC: 16 U/L (ref 15–37)
BACTERIA URNS QL MICRO: NEGATIVE /HPF
BILIRUB SERPL-MCNC: 0.5 MG/DL (ref 0.2–1)
BILIRUB UR QL: NEGATIVE
BUN SERPL-MCNC: 18 MG/DL (ref 6–20)
BUN/CREAT SERPL: 16 (ref 12–20)
CALCIUM SERPL-MCNC: 9.1 MG/DL (ref 8.5–10.1)
CHLORIDE SERPL-SCNC: 107 MMOL/L (ref 97–108)
CHOLEST SERPL-MCNC: 152 MG/DL
CO2 SERPL-SCNC: 27 MMOL/L (ref 21–32)
COLOR UR: ABNORMAL
CREAT SERPL-MCNC: 1.1 MG/DL (ref 0.7–1.3)
EPITH CASTS URNS QL MICRO: ABNORMAL /LPF
GLOBULIN SER CALC-MCNC: 3.4 G/DL (ref 2–4)
GLUCOSE SERPL-MCNC: 102 MG/DL (ref 65–100)
GLUCOSE UR STRIP.AUTO-MCNC: NEGATIVE MG/DL
HCV AB SER IA-ACNC: 0.06 INDEX
HCV AB SERPL QL IA: NONREACTIVE
HDLC SERPL-MCNC: 45 MG/DL
HDLC SERPL: 3.4 (ref 0–5)
HGB UR QL STRIP: ABNORMAL
HYALINE CASTS URNS QL MICRO: ABNORMAL /LPF (ref 0–5)
KETONES UR QL STRIP.AUTO: NEGATIVE MG/DL
LDLC SERPL CALC-MCNC: 86.6 MG/DL (ref 0–100)
LEUKOCYTE ESTERASE UR QL STRIP.AUTO: NEGATIVE
NITRITE UR QL STRIP.AUTO: NEGATIVE
PH UR STRIP: 6.5 (ref 5–8)
POTASSIUM SERPL-SCNC: 4.4 MMOL/L (ref 3.5–5.1)
PROT SERPL-MCNC: 8 G/DL (ref 6.4–8.2)
PROT UR STRIP-MCNC: ABNORMAL MG/DL
PSA SERPL-MCNC: 0.5 NG/ML (ref 0.01–4)
RBC #/AREA URNS HPF: ABNORMAL /HPF (ref 0–5)
SODIUM SERPL-SCNC: 138 MMOL/L (ref 136–145)
SP GR UR REFRACTOMETRY: 1.02 (ref 1–1.03)
TRIGL SERPL-MCNC: 102 MG/DL
UROBILINOGEN UR QL STRIP.AUTO: 0.2 EU/DL (ref 0.2–1)
VLDLC SERPL CALC-MCNC: 20.4 MG/DL
WBC URNS QL MICRO: ABNORMAL /HPF (ref 0–4)

## 2025-05-29 NOTE — TELEPHONE ENCOUNTER
[facility] requesting [facility] refill.  (Noted tried contacting patient w/phone not valid- (cell phone# is correct)    Tried contacting patient via cell/mobile.  LVM to contact the office to schedule appt due to MD Hernandez retired in order to obtain refills.  ThanksChloé    For Pharmacy Admin Tracking Only    Program: Medication Refill  CPA in place:    Recommendation Provided To:   Intervention Detail: Discontinued Rx: 2, reason: Duplicate Therapy  Intervention Accepted By:   Gap Closed?:    Time Spent (min): 10